# Patient Record
Sex: FEMALE | Race: ASIAN | NOT HISPANIC OR LATINO | Employment: OTHER | URBAN - METROPOLITAN AREA
[De-identification: names, ages, dates, MRNs, and addresses within clinical notes are randomized per-mention and may not be internally consistent; named-entity substitution may affect disease eponyms.]

---

## 2018-03-19 ENCOUNTER — RECORDS - HEALTHEAST (OUTPATIENT)
Dept: LAB | Facility: CLINIC | Age: 53
End: 2018-03-19

## 2018-03-19 LAB
CHOLEST SERPL-MCNC: 166 MG/DL
FASTING STATUS PATIENT QL REPORTED: ABNORMAL
FERRITIN SERPL-MCNC: 91 NG/ML (ref 10–130)
HDLC SERPL-MCNC: 56 MG/DL
LDLC SERPL CALC-MCNC: 78 MG/DL
TRIGL SERPL-MCNC: 158 MG/DL
TSH SERPL DL<=0.005 MIU/L-ACNC: 1.9 UIU/ML (ref 0.3–5)

## 2018-03-20 LAB
HPV SOURCE: NORMAL
HUMAN PAPILLOMA VIRUS 16 DNA: NEGATIVE
HUMAN PAPILLOMA VIRUS 18 DNA: NEGATIVE
HUMAN PAPILLOMA VIRUS FINAL DIAGNOSIS: NORMAL
HUMAN PAPILLOMA VIRUS OTHER HR: NEGATIVE
SPECIMEN DESCRIPTION: NORMAL

## 2018-03-23 LAB
BKR LAB AP ABNORMAL BLEEDING: NO
BKR LAB AP BIRTH CONTROL/HORMONES: NORMAL
BKR LAB AP CERVICAL APPEARANCE: NORMAL
BKR LAB AP GYN ADEQUACY: NORMAL
BKR LAB AP GYN INTERPRETATION: NORMAL
BKR LAB AP HPV REFLEX: NORMAL
BKR LAB AP LMP: NORMAL
BKR LAB AP PATIENT STATUS: NORMAL
BKR LAB AP PREVIOUS ABNORMAL: NORMAL
BKR LAB AP PREVIOUS NORMAL: 2016
HIGH RISK?: NO
PATH REPORT.COMMENTS IMP SPEC: NORMAL
RESULT FLAG (HE HISTORICAL CONVERSION): NORMAL

## 2019-08-23 ENCOUNTER — RECORDS - HEALTHEAST (OUTPATIENT)
Dept: LAB | Facility: CLINIC | Age: 54
End: 2019-08-23

## 2019-08-23 LAB
CHOLEST SERPL-MCNC: 203 MG/DL
FASTING STATUS PATIENT QL REPORTED: ABNORMAL
HDLC SERPL-MCNC: 60 MG/DL
LDLC SERPL CALC-MCNC: 127 MG/DL
TRIGL SERPL-MCNC: 82 MG/DL

## 2022-02-04 ENCOUNTER — LAB REQUISITION (OUTPATIENT)
Dept: LAB | Facility: CLINIC | Age: 57
End: 2022-02-04

## 2022-02-04 DIAGNOSIS — Z13.220 ENCOUNTER FOR SCREENING FOR LIPOID DISORDERS: ICD-10-CM

## 2022-02-04 LAB
CHOLEST SERPL-MCNC: 199 MG/DL
FASTING STATUS PATIENT QL REPORTED: NORMAL
HDLC SERPL-MCNC: 61 MG/DL
LDLC SERPL CALC-MCNC: 122 MG/DL
TRIGL SERPL-MCNC: 78 MG/DL

## 2022-02-04 PROCEDURE — 80061 LIPID PANEL: CPT | Performed by: NURSE PRACTITIONER

## 2022-02-21 ENCOUNTER — TRANSFERRED RECORDS (OUTPATIENT)
Dept: HEALTH INFORMATION MANAGEMENT | Facility: CLINIC | Age: 57
End: 2022-02-21

## 2022-02-23 ENCOUNTER — NURSE TRIAGE (OUTPATIENT)
Dept: NURSING | Facility: CLINIC | Age: 57
End: 2022-02-23

## 2022-02-24 NOTE — TELEPHONE ENCOUNTER
Nava calls and says that she has a rash all over her body. Rash began last night. Rash is pink/red-colored, raised, and itchy. Pt. Says that she had been taking an antibiotic last week Monday-Friday. Denies difficulty breathing or swallowing. Pt. Says that she will call her clinic tomorrow and try to get an appointment. COVID 19 Nurse Triage Plan/Patient Instructions    Please be aware that novel coronavirus (COVID-19) may be circulating in the community. If you develop symptoms such as fever, cough, or SOB or if you have concerns about the presence of another infection including coronavirus (COVID-19), please contact your health care provider or visit [email or web address].     Disposition/Instructions    In-Person Visit with provider recommended. Reference Visit Selection Guide.    Thank you for taking steps to prevent the spread of this virus.  o Limit your contact with others.  o Wear a simple mask to cover your cough.  o Wash your hands well and often.    Resources    [facility]: About COVID-19: [email or web address]    CDC: What to Do If You're Sick: www.cdc.gov/coronavirus/2019-ncov/about/steps-when-sick.html    CDC: Ending Home Isolation: www.cdc.gov/coronavirus/2019-ncov/hcp/disposition-in-home-patients.html     CDC: Caring for Someone: www.cdc.gov/coronavirus/2019-ncov/if-you-are-sick/care-for-someone.html     [facility]: Interim Guidance for Hospital Discharge to Home: www.health.[facility].mn.us/diseases/coronavirus/hcp/hospdischarge.pdf    [facility] clinical trials (COVID-19 research studies): [email or web address]     Below are the COVID-19 hotlines at the [place] Department of Health ([facility]). Interpreters are available.   o For health questions: Call 329-486-8307 or 1-487.425.7788 (7 a.m. to 7 p.m.)  o For questions about schools and childcare: Call 607-874-1222 or 1-929.863.9558 (7 a.m. to 7 p.m.)                   Reason for Disposition    Rash  started more than 3 days after stopping new prescription medicine    Mild widespread rash    Additional Information    Negative: [1] Life-threatening reaction (anaphylaxis) in the past to similar substance (e.g., food, insect bite/sting, chemical, etc.) AND [2] < 2 hours since exposure    Negative: [1] Sudden onset of rash (within last 2 hours) AND [2] difficulty with breathing or swallowing    Negative: Shock suspected (e.g., cold/pale/clammy skin, too weak to stand, low BP, rapid pulse)    Negative: Difficult to awaken or acting confused (e.g., disoriented, slurred speech)    Negative: [1] Purple or blood-colored spots or dots AND [2] fever    Negative: Sounds like a life-threatening emergency to the triager    Negative: Insect bites suspected    Negative: Swimmer's Itch suspected    Negative: Sunburn suspected    Negative: Hives suspected    Negative: Measles suspected AND [2] known exposure to measles in past 3 weeks    Negative: [1] Chickenpox suspected AND [2] known exposure to chickenpox in past 3 weeks    Negative: [1] Life-threatening reaction (anaphylaxis) in the past to the same drug AND [2] < 2 hours since exposure    Negative: Difficulty breathing or wheezing    Negative: [1] Hoarseness or cough AND [2] started soon after 1st dose of drug    Negative: [1] Swollen tongue AND [2] started soon after 1st dose of drug    Negative: [1] Purple or blood-colored rash (spots or dots) AND [2] fever    Negative: Sounds like a life-threatening emergency to the triager    Negative: Rash is only on 1 part of the body (localized)    Negative: Taking new non-prescription (OTC) antihistamine, decongestant, ear drops, eye drops, or other OTC cough/cold medicine    Negative: Taking new prescription antihistamine, allergy medicine, asthma medicine, eye drops, ear drops or nose drops    Negative: [1] Drug rash suspected AND [2] started taking new medicine within last 2 weeks(Exception: antihistamine, eye drops, ear drops,  "decongestant or other OTC cough/cold medicines)    Negative: [1] Widespread rash AND [2] bright red, sunburn-like AND [3] current tampon use or nasal packing    Negative: [1] Widespread rash AND [2] bright red, sunburn-like AND [3] wound infection or recent surgery    Negative: [1] Bright red skin AND [2] peels off in sheets    Negative: Stiff neck (unable to touch chin to chest)    Negative: Fever    Negative: Joint pain or swelling    Negative: Rash looks like large or small blisters (i.e., fluid filled bubbles or sacs on the skin)    Negative: Patient sounds very sick or weak to the triager    Negative: [1] Purple or blood-colored rash (spots or dots) AND [2] no fever AND [3] sounds well to triager    Negative: Sores in mouth    Negative: Face becomes swollen    Negative: [1] Headache AND [2] no fever    Negative: SEVERE itching (i.e., interferes with sleep, normal activities or school)    Negative: Sore throat    Negative: Ring-like appearance of rash (or ask: does it look like a  \"target\" or \"bulls- eye\")    Negative: Pregnant    Protocols used: RASH - WIDESPREAD ON DRUGS-A-AH, RASH OR REDNESS - WIDESPREAD-A-AH    "

## 2023-04-05 ENCOUNTER — TRANSFERRED RECORDS (OUTPATIENT)
Dept: HEALTH INFORMATION MANAGEMENT | Facility: CLINIC | Age: 58
End: 2023-04-05
Payer: COMMERCIAL

## 2023-04-13 ENCOUNTER — TRANSFERRED RECORDS (OUTPATIENT)
Dept: HEALTH INFORMATION MANAGEMENT | Facility: CLINIC | Age: 58
End: 2023-04-13
Payer: COMMERCIAL

## 2023-04-13 ENCOUNTER — LAB REQUISITION (OUTPATIENT)
Dept: LAB | Facility: CLINIC | Age: 58
End: 2023-04-13

## 2023-04-13 DIAGNOSIS — R00.2 PALPITATIONS: ICD-10-CM

## 2023-04-13 DIAGNOSIS — R42 DIZZINESS AND GIDDINESS: ICD-10-CM

## 2023-04-13 DIAGNOSIS — Z13.220 ENCOUNTER FOR SCREENING FOR LIPOID DISORDERS: ICD-10-CM

## 2023-04-13 DIAGNOSIS — Z12.4 ENCOUNTER FOR SCREENING FOR MALIGNANT NEOPLASM OF CERVIX: ICD-10-CM

## 2023-04-13 PROCEDURE — G0145 SCR C/V CYTO,THINLAYER,RESCR: HCPCS | Performed by: NURSE PRACTITIONER

## 2023-04-13 PROCEDURE — 84443 ASSAY THYROID STIM HORMONE: CPT | Performed by: NURSE PRACTITIONER

## 2023-04-13 PROCEDURE — 80048 BASIC METABOLIC PNL TOTAL CA: CPT | Performed by: NURSE PRACTITIONER

## 2023-04-13 PROCEDURE — 87624 HPV HI-RISK TYP POOLED RSLT: CPT | Performed by: NURSE PRACTITIONER

## 2023-04-13 PROCEDURE — 80061 LIPID PANEL: CPT | Performed by: NURSE PRACTITIONER

## 2023-04-14 LAB
ANION GAP SERPL CALCULATED.3IONS-SCNC: 11 MMOL/L (ref 7–15)
BUN SERPL-MCNC: 17.1 MG/DL (ref 8–23)
CALCIUM SERPL-MCNC: 9.4 MG/DL (ref 8.6–10)
CHLORIDE SERPL-SCNC: 106 MMOL/L (ref 98–107)
CHOLEST SERPL-MCNC: 187 MG/DL
CREAT SERPL-MCNC: 0.77 MG/DL (ref 0.51–0.95)
DEPRECATED HCO3 PLAS-SCNC: 25 MMOL/L (ref 22–29)
GFR SERPL CREATININE-BSD FRML MDRD: 88 ML/MIN/1.73M2
GLUCOSE SERPL-MCNC: 88 MG/DL (ref 70–99)
HDLC SERPL-MCNC: 62 MG/DL
LDLC SERPL CALC-MCNC: 98 MG/DL
NONHDLC SERPL-MCNC: 125 MG/DL
POTASSIUM SERPL-SCNC: 3.7 MMOL/L (ref 3.4–5.3)
SODIUM SERPL-SCNC: 142 MMOL/L (ref 136–145)
TRIGL SERPL-MCNC: 135 MG/DL
TSH SERPL DL<=0.005 MIU/L-ACNC: 2.02 UIU/ML (ref 0.3–4.2)

## 2023-04-18 LAB
BKR LAB AP GYN ADEQUACY: NORMAL
BKR LAB AP GYN INTERPRETATION: NORMAL
BKR LAB AP HPV REFLEX: NORMAL
BKR LAB AP PREVIOUS ABNORMAL: NORMAL
PATH REPORT.COMMENTS IMP SPEC: NORMAL
PATH REPORT.COMMENTS IMP SPEC: NORMAL
PATH REPORT.RELEVANT HX SPEC: NORMAL

## 2023-04-19 ENCOUNTER — TRANSFERRED RECORDS (OUTPATIENT)
Dept: HEALTH INFORMATION MANAGEMENT | Facility: CLINIC | Age: 58
End: 2023-04-19
Payer: COMMERCIAL

## 2023-04-20 LAB
HUMAN PAPILLOMA VIRUS 16 DNA: NEGATIVE
HUMAN PAPILLOMA VIRUS 18 DNA: NEGATIVE
HUMAN PAPILLOMA VIRUS FINAL DIAGNOSIS: NORMAL
HUMAN PAPILLOMA VIRUS OTHER HR: NEGATIVE

## 2023-05-02 ENCOUNTER — MEDICAL CORRESPONDENCE (OUTPATIENT)
Dept: HEALTH INFORMATION MANAGEMENT | Facility: CLINIC | Age: 58
End: 2023-05-02
Payer: COMMERCIAL

## 2023-05-03 ENCOUNTER — PATIENT OUTREACH (OUTPATIENT)
Dept: ONCOLOGY | Facility: CLINIC | Age: 58
End: 2023-05-03
Payer: COMMERCIAL

## 2023-05-03 ENCOUNTER — TRANSCRIBE ORDERS (OUTPATIENT)
Dept: OTHER | Age: 58
End: 2023-05-03

## 2023-05-03 ENCOUNTER — TRANSCRIBE ORDERS (OUTPATIENT)
Dept: ONCOLOGY | Facility: CLINIC | Age: 58
End: 2023-05-03
Payer: COMMERCIAL

## 2023-05-03 DIAGNOSIS — C50.912 INFILTRATING DUCTAL CARCINOMA OF LEFT BREAST (H): Primary | ICD-10-CM

## 2023-05-03 NOTE — PROGRESS NOTES
New Patient Oncology Nurse Navigator Note     Referring provider: Cadence Quintero     Referring Clinic/Organization: [facility]     Referred to (specialty:) Medical Oncology and Cancer Surgery      Date Referral Received: May 3, 2023     Evaluation for:  C50.912 (ICD-10-CM) - Infiltrating ductal carcinoma of left breast (H)    Clinical History (per Nurse review of records provided):      Of note, patient has bilateral retropectoral silicone gel implants with a normal contour.     Nava had bilateral screening mammograms on 4/5/23 ([facility] and a focal asymmetry was identified in the lower out left breast, 5 cm from the nipple.     4/19/23 - [facility]  A spiculated mass was confirmed in the lateral left breast 4 to 5 cm from the nipple. Left breast ultrasound showed a 10 x 7 x 10 mm hypoechoic lobulated mass with vascular flow demonstrated, concordant with mammographic findings. Attention to left axilla indicated no pathological adenopathy.    4/24/23 - Case: [id number]                                  A) LEFT BREAST, 3:00, 4 CM FROM NIPPLE, ULTRASOUND-GUIDED CORE BIOPSY:   1. Invasive ductal carcinoma      a. [place] grade: II of III; Cecelia score: 7 of 9      b. Angio-lymphatic invasion: Suspicious      c. Associated DCIS: Present      d. Subtype: Cribriform and Solid      e. Grade of DCIS: 2 and 3 of 3   2. Breast Ancillary Testing:         a. Hormone Receptors:             Estrogen receptor: Positive (98%, strong staining)             Progesterone receptor: Positive (76%, moderate staining)       b. HER2 by IHC: Negative (1+ by manual morphometry)           c. Ki-67: 22% by image analysis    Patient resides in [place], [place].    Records Location: Care Everywhere, Media and See Bookmarked material     Records Needed:   Breast imaging for past 5 years - Nothing in PACS as of     5/4 9:54 - With assistance of  [place] [id number] telephoned and  left voice message for patient requesting call back to assist in scheduling surgery consult.     5/5 8:58 - Nava left voice message indicating her mobile number should be primary. Change made by writer in demographics. She also does NOT speak [place]. Telephoned and spoke with Nava and call transferred to New Patient Scheduling to finalize appointment with Dr. Brewster for surgery consult.  Medical oncology consult will be deferred to surgeon's order.

## 2023-05-05 NOTE — PROGRESS NOTES
History:  Carol is a 58 year old female who I'm asked to see by Cadence Quintero CNP for evaluation of a left breast cancer.  She presents with her , Juice.  This was picked up by screening mammogram.  This was a new asymmetry compared to previous imaging from 2022.  She then underwent diagnostic work-up.  A needle biopsy was done which shows a grade II invasive ductal carcinoma.  It is estrogen receptor positive, progesterone receptor negative, and HER-2 negative.  A post clip placement mammogram was not obtained.  She denies having any breast symptoms prior to this.  Denies palpable masses, skin changes, or nipple discharge.  She has had abnormal mammograms in the past requiring biopsy.  They came back benign.    Past medical history:  Denies    Past surgical history:  Augmentation mammoplasty.  Subpectoral at least 15 years ago.    Medications:     calcium carbonate 500 mg, elemental, (OSCAL 500) 1250 (500 Ca) MG TABS tablet, , Disp: , Rfl:      fish oil-omega-3 fatty acids (OMEGA-3 FISH OIL) 1000 MG capsule, Take 1 capsule by mouth, Disp: , Rfl:      vitamin C (ASCORBIC ACID) 500 MG tablet, Take by mouth daily, Disp: , Rfl:      VITAMIN E PO, Take by mouth daily, Disp: , Rfl:      zinc 50 MG TABS, Take by mouth daily, Disp: , Rfl:     Allergies:  No Known Allergies    Social History:  Denies alcohol, tobacco, illicit drug use.  .  Has 2 siblings and 2 sons.  Both are teenagers.  She is planning on visiting her parents in [place] for 4 weeks and was planning on leaving the middle of June.    Family History:  She has a sister who had breast cancer in her 40s.  She is unsure if genetic testing was performed.    Review of systems:  General ROS: No complaints or constitutional symptoms  Skin: No complaints or symptoms   Hematologic/Lymphatic: No symptoms or complaints  Psychiatric: No symptoms or complaints  Endocrine: No excessive fatigue, no hypermetabolic symptoms reported  Respiratory ROS: No cough,  shortness of breath, or wheezing  Cardiovascular ROS: No chest pain or dyspnea on exertion  Breast ROS: Significant bruising since biopsy  Gastrointestinal ROS: No abdominal pain, nausea, diarrhea, or constipation  Musculoskeletal ROS: No recent injuries reported  Neurological ROS: No focal neurologic defects reported.      Physical exam:  General: Alert, cooperative, appears stated age   Skin: Skin color, texture, turgor normal, no rashes or lesions   Lymphatic: No obvious adenopathy, no swelling   Eyes: No scleral icterus, pupils equal  HENT: No traumatic injury to the head or face, no gross abnormalities  Lungs: Normal respiratory effort, breath sounds equal bilaterally  Heart: Regular rate and rhythm  Breasts: Good shape and symmetry visually.  Scars from breast augmentation are in the bilateral axilla.  Significant ecchymosis to the left breast around the areola from 3:00 around to 9:00.  Left breast 3:00 zone 2 has a palpable 1 cm fullness consistent with her known location of her tumor.  No palpable lymphadenopathy.  The bilateral implants are riding up high closer to the upper outer quadrant  Abdomen: Soft, non-distended and non-tender to palpation  Neurologic: Grossly intact    Imaging:  Pertinent images personally reviewed by myself and discussed with the patient.    Radiology reports:  Exam Date: 04/19/2023  EXAM: DIAGNOSTIC LEFT DIGITAL TOMOSYNTHESIS MAMMOGRAPHY WITH CAD AND FOCUSED LEFT BREAST ULTRASOUND EXAM.  CLINICAL INFORMATION: Follow-up study to an abnormal mammogram dated 4/5/2023  COMPARISON: 4/5/2023    TECHNIQUE:  - Diagnostic Mammography: Left spot CC and left full-field ML tomographic images. CAD was applied.  - Breast Ultrasound was performed using high-resolution ultrasound transducer. Study was focused towards the area of clinical/ mammographic abnormality only.  Total Lifetime Breast Cancer Risk assessment (TLR): 7.38%, Low Risk Category (<10%) based on information provided by the  patient and mammographic breast density utilizing recognized breast cancer risk assessment model. National average TLR =12.5%.    Mammogram: There is a spiculated mass confirmed in the lateral left breast 4 to 5 cm from the nipple. Further evaluation by ultrasound is recommended and will be performed.    Ultrasound: Attention was directed to the region of clinical concern at 3 o'clock, 4 cm from the nipple. There is a 10 x 7 x 10 mm hypoechoic lobulated mass with vascular flow demonstrated, concordant with mammographic findings. The mass is considered suspicious.  Attention was also directed to the left axilla. There is no pathologic adenopathy.    CONCLUSION:  1.10 mm suspicious mass in the left breast without axillary adenopathy.  ACR BI-RADS Category 5-highly suspicious    My interpretation:  All of the images were not pushed over.  I was able to log into Personal Style Finder to review them.  With the implant pushed out of the way, and upper outer quadrant mass can be seen within the left breast.    Pathology:  A) LEFT BREAST, 3:00, 4 CM FROM NIPPLE, ULTRASOUND-GUIDED CORE BIOPSY:   1. Invasive ductal carcinoma      a. Cecelia grade: II of III; Cecelia score: 7 of 9      b. Angio-lymphatic invasion: Suspicious      c. Associated DCIS: Present      d. Subtype: Cribriform and Solid      e. Grade of DCIS: 2 and 3 of 3   2. Breast Ancillary Testing:         a. Hormone Receptors:             Estrogen receptor: Positive (98%, strong staining)             Progesterone receptor: Positive (76%, moderate staining)       b. HER2 by IHC: Negative (1+ by manual morphometry)           c. Ki-67: 22% by image analysis    IMPRESSION:  Left breast invasive ductal carcinoma    - Grade II, T1, N0, ER/OK+, HER2-    PLAN:   Discussed the surgical options for treatment of breast cancer which generally are a lumpectomy (partial mastectomy) combined with radiation versus a mastectomy.  Explained that the survival benefit is the same for both.   The difference is in local recurrence risk.  The patient is a good candidate for a lumpectomy.  With the palpable fullness and how small her breasts are, I would plan on using ultrasound to visualize and plan the lumpectomy.  For mastectomy, she would be a candidate for skin sparing or nipple sparing mastectomies.  With the location of her tumor, I would prefer a periareolar approach.  Discussed SLN biopsy.  The procedure and rationale were explained.  Discussed that at this point we do not know yet whether or not she will need chemotherapy and we may not know until we get all of the results of surgery back.  Sometimes the need for chemotherapy is dependent upon an Oncotype score.  Since the tumor is estrogen receptor positive, she will be a candidate for endocrine therapy.    After our discussion, all questions were answered to satisfaction.  With her family history of cancers, a referral has been placed to genetic counseling.  She is interested in pursuing testing so the breast actionable panel has been ordered and consent obtained.  I will give her a call once I have the results in about 3 weeks.  While she is waiting for the genetic testing results, she will continue to think about the surgery options.  If she is at all contemplating mastectomy with reconstruction, then I encouraged her to meet with plastic surgery.  If she went this route, her surgery would likely not be until after her trip to [place].  I would then place her on neoadjuvant endocrine therapy while she waits for surgery.  She understands that our breast procedures are outpatient with same-day discharge.  Lumpectomy is performed under MAC anesthesia.  Mastectomy based procedures are performed under general anesthesia.  With mastectomies drains are left in place.  The risks and benefits of the respective surgeries were explained.  Also talked about expected recovery time.  She would then follow-up with myself about 1-2 weeks after surgery to review  final pathology and next steps.    Arina Brewster DO  General Surgeon  [facility]  Breast [address] [address]  Office: [phone]  Employed by - [facility]

## 2023-05-08 ENCOUNTER — LAB (OUTPATIENT)
Dept: LAB | Facility: CLINIC | Age: 58
End: 2023-05-08
Payer: COMMERCIAL

## 2023-05-08 ENCOUNTER — PATIENT OUTREACH (OUTPATIENT)
Dept: ONCOLOGY | Facility: CLINIC | Age: 58
End: 2023-05-08

## 2023-05-08 ENCOUNTER — OFFICE VISIT (OUTPATIENT)
Dept: SURGERY | Facility: CLINIC | Age: 58
End: 2023-05-08
Attending: SURGERY
Payer: COMMERCIAL

## 2023-05-08 DIAGNOSIS — C50.912 INVASIVE DUCTAL CARCINOMA OF BREAST, LEFT (H): ICD-10-CM

## 2023-05-08 DIAGNOSIS — C50.912 INVASIVE DUCTAL CARCINOMA OF BREAST, LEFT (H): Primary | ICD-10-CM

## 2023-05-08 LAB
INTERPRETATION: NORMAL
LAB PDF RESULT: NORMAL
SIGNIFICANT RESULTS: NORMAL
SPECIMEN DESCRIPTION: NORMAL
TEST DETAILS, MDL: NORMAL

## 2023-05-08 PROCEDURE — 36415 COLL VENOUS BLD VENIPUNCTURE: CPT

## 2023-05-08 PROCEDURE — 99244 OFF/OP CNSLTJ NEW/EST MOD 40: CPT | Performed by: SURGERY

## 2023-05-08 PROCEDURE — G0463 HOSPITAL OUTPT CLINIC VISIT: HCPCS | Performed by: SURGERY

## 2023-05-08 RX ORDER — CHLORAL HYDRATE 500 MG
1 CAPSULE ORAL
COMMUNITY

## 2023-05-08 RX ORDER — ASCORBIC ACID 500 MG
TABLET ORAL DAILY
COMMUNITY

## 2023-05-08 RX ORDER — IBUPROFEN 200 MG
CAPSULE ORAL
COMMUNITY

## 2023-05-08 RX ORDER — GINSENG 100 MG
CAPSULE ORAL DAILY
COMMUNITY

## 2023-05-08 NOTE — NURSING NOTE
Cherelle presents to [facility] today for a surgical consult with Dr. Brewster  regarding her newly diagnosed  breast cancer.  She is accompanied by her , danish,  for consult.  RN assessment and EMR update.   Patient given a Breast Cancer Packet, contents reviewed.  She met with Dr. Brewster  see dictation for details of visit. She will plan genetic testing, will have blood draw on her way out today.  She will think over her options and call to schedule when she has a decision made.  Support provided, invited calls.  RN time 15 mins.

## 2023-05-08 NOTE — LETTER
5/8/2023         RE: Nava Goldman  [address] [facility] [address]        Dear Colleague,    Thank you for referring your patient, Nava Goldman, to the [facility] [place]. Please see a copy of my visit note below.    History:  Carol is a 58 year old female who I'm asked to see by Cadence Quintero CNP for evaluation of a left breast cancer.  She presents with her , Juice.  This was picked up by screening mammogram.  This was a new asymmetry compared to previous imaging from 2022.  She then underwent diagnostic work-up.  A needle biopsy was done which shows a grade II invasive ductal carcinoma.  It is estrogen receptor positive, progesterone receptor negative, and HER-2 negative.  A post clip placement mammogram was not obtained.  She denies having any breast symptoms prior to this.  Denies palpable masses, skin changes, or nipple discharge.  She has had abnormal mammograms in the past requiring biopsy.  They came back benign.    Past medical history:  Denies    Past surgical history:  Augmentation mammoplasty.  Subpectoral at least 15 years ago.    Medications:     calcium carbonate 500 mg, elemental, (OSCAL 500) 1250 (500 Ca) MG TABS tablet, , Disp: , Rfl:      fish oil-omega-3 fatty acids (OMEGA-3 FISH OIL) 1000 MG capsule, Take 1 capsule by mouth, Disp: , Rfl:      vitamin C (ASCORBIC ACID) 500 MG tablet, Take by mouth daily, Disp: , Rfl:      VITAMIN E PO, Take by mouth daily, Disp: , Rfl:      zinc 50 MG TABS, Take by mouth daily, Disp: , Rfl:     Allergies:  No Known Allergies    Social History:  Denies alcohol, tobacco, illicit drug use.  .  Has 2 siblings and 2 sons.  Both are teenagers.  She is planning on visiting her parents in [place] for 4 weeks and was planning on leaving the middle of June.    Family History:  She has a sister who had breast cancer in her 40s.  She is unsure if genetic testing was performed.    Review of systems:  General ROS:  No complaints or constitutional symptoms  Skin: No complaints or symptoms   Hematologic/Lymphatic: No symptoms or complaints  Psychiatric: No symptoms or complaints  Endocrine: No excessive fatigue, no hypermetabolic symptoms reported  Respiratory ROS: No cough, shortness of breath, or wheezing  Cardiovascular ROS: No chest pain or dyspnea on exertion  Breast ROS: Significant bruising since biopsy  Gastrointestinal ROS: No abdominal pain, nausea, diarrhea, or constipation  Musculoskeletal ROS: No recent injuries reported  Neurological ROS: No focal neurologic defects reported.      Physical exam:  General: Alert, cooperative, appears stated age   Skin: Skin color, texture, turgor normal, no rashes or lesions   Lymphatic: No obvious adenopathy, no swelling   Eyes: No scleral icterus, pupils equal  HENT: No traumatic injury to the head or face, no gross abnormalities  Lungs: Normal respiratory effort, breath sounds equal bilaterally  Heart: Regular rate and rhythm  Breasts: Good shape and symmetry visually.  Scars from breast augmentation are in the bilateral axilla.  Significant ecchymosis to the left breast around the areola from 3:00 around to 9:00.  Left breast 3:00 zone 2 has a palpable 1 cm fullness consistent with her known location of her tumor.  No palpable lymphadenopathy.  The bilateral implants are riding up high closer to the upper outer quadrant  Abdomen: Soft, non-distended and non-tender to palpation  Neurologic: Grossly intact    Imaging:  Pertinent images personally reviewed by myself and discussed with the patient.    Radiology reports:  Exam Date: 04/19/2023  EXAM: DIAGNOSTIC LEFT DIGITAL TOMOSYNTHESIS MAMMOGRAPHY WITH CAD AND FOCUSED LEFT BREAST ULTRASOUND EXAM.  CLINICAL INFORMATION: Follow-up study to an abnormal mammogram dated 4/5/2023  COMPARISON: 4/5/2023    TECHNIQUE:  - Diagnostic Mammography: Left spot CC and left full-field ML tomographic images. CAD was applied.  - Breast Ultrasound was  performed using high-resolution ultrasound transducer. Study was focused towards the area of clinical/ mammographic abnormality only.  Total Lifetime Breast Cancer Risk assessment (TLR): 7.38%, Low Risk Category (<10%) based on information provided by the patient and mammographic breast density utilizing recognized breast cancer risk assessment model. National average TLR =12.5%.    Mammogram: There is a spiculated mass confirmed in the lateral left breast 4 to 5 cm from the nipple. Further evaluation by ultrasound is recommended and will be performed.    Ultrasound: Attention was directed to the region of clinical concern at 3 o'clock, 4 cm from the nipple. There is a 10 x 7 x 10 mm hypoechoic lobulated mass with vascular flow demonstrated, concordant with mammographic findings. The mass is considered suspicious.  Attention was also directed to the left axilla. There is no pathologic adenopathy.    CONCLUSION:  1.10 mm suspicious mass in the left breast without axillary adenopathy.  ACR BI-RADS Category 5-highly suspicious    My interpretation:  All of the images were not pushed over.  I was able to log into [employer] to review them.  With the implant pushed out of the way, and upper outer quadrant mass can be seen within the left breast.    Pathology:  A) LEFT BREAST, 3:00, 4 CM FROM NIPPLE, ULTRASOUND-GUIDED CORE BIOPSY:   1. Invasive ductal carcinoma      a. [place] grade: II of III; Cecelia score: 7 of 9      b. Angio-lymphatic invasion: Suspicious      c. Associated DCIS: Present      d. Subtype: Cribriform and Solid      e. Grade of DCIS: 2 and 3 of 3   2. Breast Ancillary Testing:         a. Hormone Receptors:             Estrogen receptor: Positive (98%, strong staining)             Progesterone receptor: Positive (76%, moderate staining)       b. HER2 by IHC: Negative (1+ by manual morphometry)           c. Ki-67: 22% by image analysis    IMPRESSION:  Left breast invasive ductal carcinoma    - Grade II,  T1, N0, ER/AZ+, HER2-    PLAN:   Discussed the surgical options for treatment of breast cancer which generally are a lumpectomy (partial mastectomy) combined with radiation versus a mastectomy.  Explained that the survival benefit is the same for both.  The difference is in local recurrence risk.  The patient is a good candidate for a lumpectomy.  With the palpable fullness and how small her breasts are, I would plan on using ultrasound to visualize and plan the lumpectomy.  For mastectomy, she would be a candidate for skin sparing or nipple sparing mastectomies.  With the location of her tumor, I would prefer a periareolar approach.  Discussed SLN biopsy.  The procedure and rationale were explained.  Discussed that at this point we do not know yet whether or not she will need chemotherapy and we may not know until we get all of the results of surgery back.  Sometimes the need for chemotherapy is dependent upon an Oncotype score.  Since the tumor is estrogen receptor positive, she will be a candidate for endocrine therapy.    After our discussion, all questions were answered to satisfaction.  With her family history of cancers, a referral has been placed to genetic counseling.  She is interested in pursuing testing so the breast actionable panel has been ordered and consent obtained.  I will give her a call once I have the results in about 3 weeks.  While she is waiting for the genetic testing results, she will continue to think about the surgery options.  If she is at all contemplating mastectomy with reconstruction, then I encouraged her to meet with plastic surgery.  If she went this route, her surgery would likely not be until after her trip to [place].  I would then place her on neoadjuvant endocrine therapy while she waits for surgery.  She understands that our breast procedures are outpatient with same-day discharge.  Lumpectomy is performed under MAC anesthesia.  Mastectomy based procedures are performed under  general anesthesia.  With mastectomies drains are left in place.  The risks and benefits of the respective surgeries were explained.  Also talked about expected recovery time.  She would then follow-up with myself about 1-2 weeks after surgery to review final pathology and next steps.    Arina Brewster DO  General Surgeon  [facility]  Breast [address] [address]  Office: [phone]  Employed by - [facility]            Again, thank you for allowing me to participate in the care of your patient.        Sincerely,        Arina Brewster DO

## 2023-05-08 NOTE — PROGRESS NOTES
Writer received referral to Cancer Risk Management/Genetic Counseling.    Referred for: breast cancer     Referral reviewed for appropriate plan, and sent to New Patient Scheduling for completion.    Bridgette García, RN, BSN  Oncology New Patient Nurse Navigator   [facility]  [phone]

## 2023-05-15 LAB — INTERPRETATION: NORMAL

## 2023-05-24 ENCOUNTER — LAB (OUTPATIENT)
Dept: LAB | Facility: CLINIC | Age: 58
End: 2023-05-24
Payer: COMMERCIAL

## 2023-05-24 ENCOUNTER — TELEPHONE (OUTPATIENT)
Dept: SURGERY | Facility: CLINIC | Age: 58
End: 2023-05-24

## 2023-05-24 DIAGNOSIS — C50.912 INFILTRATING DUCTAL CARCINOMA OF LEFT BREAST (H): Primary | ICD-10-CM

## 2023-05-24 PROCEDURE — G0452 MOLECULAR PATHOLOGY INTERPR: HCPCS | Mod: 26 | Performed by: PATHOLOGY

## 2023-05-24 PROCEDURE — 81162 BRCA1&2 GEN FULL SEQ DUP/DEL: CPT | Performed by: SURGERY

## 2023-05-24 RX ORDER — CEFAZOLIN SODIUM/WATER 2 G/20 ML
2 SYRINGE (ML) INTRAVENOUS
Status: CANCELLED | OUTPATIENT
Start: 2023-06-22

## 2023-05-24 NOTE — TELEPHONE ENCOUNTER
Cherelle called today. Has decided she will have bilateral mastectomies with immediate reconstruction.  She has checked with her insurance and she is unable to use the [facility] and will see Dr. Medina.  Message sent to his nurse, Hilary, to help patient find an appointment to see him.  Message sent to Dr. Brewster and surgery schedulers as well.    Patient had planned a trip to [place] this summer but said she has put that on hold until she figures out her surgery plans.    Support provided, invited calls.

## 2023-06-02 ENCOUNTER — OFFICE VISIT (OUTPATIENT)
Dept: PLASTIC SURGERY | Facility: AMBULATORY SURGERY CENTER | Age: 58
End: 2023-06-02
Payer: COMMERCIAL

## 2023-06-02 VITALS
SYSTOLIC BLOOD PRESSURE: 110 MMHG | WEIGHT: 116 LBS | DIASTOLIC BLOOD PRESSURE: 70 MMHG | BODY MASS INDEX: 21.9 KG/M2 | HEIGHT: 61 IN | HEART RATE: 65 BPM

## 2023-06-02 DIAGNOSIS — C50.112 MALIGNANT NEOPLASM OF CENTRAL PORTION OF LEFT BREAST IN FEMALE, ESTROGEN RECEPTOR POSITIVE (H): Primary | ICD-10-CM

## 2023-06-02 DIAGNOSIS — Z17.0 MALIGNANT NEOPLASM OF CENTRAL PORTION OF LEFT BREAST IN FEMALE, ESTROGEN RECEPTOR POSITIVE (H): Primary | ICD-10-CM

## 2023-06-02 PROCEDURE — 99205 OFFICE O/P NEW HI 60 MIN: CPT | Performed by: PLASTIC SURGERY

## 2023-06-02 NOTE — LETTER
6/2/2023         RE: Nava Goldman  [address] [facility] [address]        Dear Colleague,    Thank you for referring your patient, Nava Goldman, to the [facility] PLASTIC SURGERY CLINIC [place]. Please see a copy of my visit note below.    Chief complaint:  Left breast cancer     History of present illness:  This is a 58 year old female who I'm asked to see by Dr. Brewster for evaluation of a left breast cancer. This was picked up by screening mammogram.  A needle biopsy was done which showed  left breast invasive ductal carcinoma.  It is estrogen receptor positive, progesterone receptor  negative, and HER-2  negative.  Patient is referred to me to discuss breast reconstruction options.    Interestingly, she underwent bilateral augmentation mammoplasty in the subpectoral plane, 20 years ago.     Past medical history:  No past medical history on file.     Past surgical history:  Bilateral augmentation mammoplasty, subpectoral plane, via transaxillary approach.  Patient believe these are silicone breast implants.  Surgery was performed in [place].     Allergies:  No known drug allergies     Medications:    Current Outpatient Medications:      calcium carbonate 500 mg, elemental, (OSCAL 500) 1250 (500 Ca) MG TABS tablet, , Disp: , Rfl:      fish oil-omega-3 fatty acids (OMEGA-3 FISH OIL) 1000 MG capsule, Take 1 capsule by mouth, Disp: , Rfl:      vitamin C (ASCORBIC ACID) 500 MG tablet, Take by mouth daily, Disp: , Rfl:      VITAMIN E PO, Take by mouth daily, Disp: , Rfl:      zinc 50 MG TABS, Take by mouth daily, Disp: , Rfl:      Family history:  Sister with breast cancer     GYN history:  G2, P2     Social History:  Denies tobacco, drinks alcohol socially     Review of systems:  General ROS: No complaints or constitutional symptoms  Skin: No complaints or symptoms   Hematologic/Lymphatic: No symptoms or complaints  Psychiatric: No symptoms or complaints  Endocrine: No excessive fatigue,  "no hypermetabolic symptoms reported  Respiratory ROS: No cough, shortness of breath, or wheezing  Cardiovascular ROS: No chest pain or dyspnea on exertion  Breast ROS: Denies nipple discharge, denies nipple inversion, denies palpable breast masses, denies peau d'orange, denies capsular contracture.  Gastrointestinal ROS: No abdominal pain, nausea, diarrhea, or constipation  Musculoskeletal ROS: No recent injuries reported  Neurological ROS: No symptoms or complaints     Physical exam:  /70 (BP Location: Right arm, Patient Position: Sitting)   Pulse 65   Ht 1.549 m (5' 1\")   Wt 52.6 kg (116 lb)   BMI 21.92 kg/m    General: Alert, cooperative, appears stated age   Skin: Skin color, texture, turgor normal, no rashes or lesions   Lymphatic: No obvious adenopathy, no swelling   Eyes: No scleral icterus, pupils equal  HENT: No traumatic injury to the head or face, no gross abnormalities  Lungs: Normal respiratory effort, breath sounds equal bilaterally  Heart: Regular rate and rhythm  Breasts: Bilateral grade 1 ptosis, left breast is slightly larger than the right breast.  No nipple inversion, no peau d'orange.    There is ecchymosis on the left breast, at the level of the 3 o'clock position from the nipple areolar complex.  No palpable breast masses or nipple discharge.    Patient present with Baker 1 capsular contracture.  These are subpectoral breast implants.  On the right breast sternal notch to nipple distance 23 cm, nipple to inframammary fold distance is 5 cm, breast diameter 15 cm.  On the left breast sternal notch to nipple distance 22 cm, nipple to inframammary fold is 6 cm and breast diameter is 17 cm. There were no palpable axillary lymphadenopathies bilaterally.  Abdomen: Soft, non-distended and non-tender to palpation  Neurologic: Grossly intact                                         Assessment:  This is a 58 year old patient with newly diagnosed left breast invasive ductal carcinoma.    Patient " presents with bilateral subpectoral breast implants that are 20 years old.  Her previous augmentation mammoplasty was performed via transaxillary approach in [place].     She is scheduled with Dr. Brewster for bilateral skin sparing mastectomies, possible nipple sparing mastectomies with left sentinel lymph node biopsy.     PLAN:   This patient is an excellent candidate for immediate bilateral breast reconstruction.    Ideally, the approach for the possible nipple sparing mastectomies, would be through the inframammary fold, with removal of the subpectoral breast implants and exchange for new silicone, full height breast implants.    I will bring full height, cohesive, Allergan implants of 365 cc, 345 cc, 335 cc, and 325 cc with corresponding sizers.    I will have on standby, a full height, extra projection 450 cc tissue expander.    I will cover the implants with AlloDerm.    I will utilize the spy machine to interrogate blood supply to the mastectomy flap and nipple areolar complex bilaterally.     I also discussed with her options for autologous breast reconstruction.       I also have a lengthy discussion about the risks of the surgery and they include but are not limited to scarring, infection, potential loss of the implant requiring explantation, hematoma, seroma, breast asymmetry, nipple asymmetry, inframammary fold asymmetry, permanent loss of the sensation of the nipple areolar complex and breast skin, need for potential future revisional surgeries, possible fat grafting, relationship and intimacy problems leading to breakup and or separation/divorce. No guarantees that her reconstructive breasts will be of the exact size or shape that she has right now were given to the patient. I have stressed to the patient that this is reconstructive breast surgery after breast cancer or to prevent breast cancer not plain cosmetic breast surgery.     Patient has acknowledged all these risks and she will think about it and  will let me know what she decides.    She is also considering lumpectomy.  However, I told her that this option would require radiation to her underlying left breast implant.  Radiation will increase risk of capsular contracture, implant extrusion, and deformity on her left breast.     I will wait for her decision.     Time spent with the patient 80 minutes.    Charles Medina MD , FACS   Diplomate American Board of Plastic Surgery  Diplomate American Board of Surgery  Adj. Assistant Professor of Surgery  Division of Plastic & Reconstructive Surgery   [facility]  Office: [phone]   6/4/2023 at 9:37 PM         Again, thank you for allowing me to participate in the care of your patient.        Sincerely,        Charles Medina MD

## 2023-06-02 NOTE — PROGRESS NOTES
Chief complaint:  Left breast cancer     History of present illness:  This is a 58 year old female who I'm asked to see by Dr. Brewster for evaluation of a left breast cancer. This was picked up by screening mammogram.  A needle biopsy was done which showed  left breast invasive ductal carcinoma.  It is estrogen receptor positive, progesterone receptor  negative, and HER-2  negative.  Patient is referred to me to discuss breast reconstruction options.    Interestingly, she underwent bilateral augmentation mammoplasty in the subpectoral plane, 20 years ago.     Past medical history:  No past medical history on file.     Past surgical history:  Bilateral augmentation mammoplasty, subpectoral plane, via transaxillary approach.  Patient believe these are silicone breast implants.  Surgery was performed in [place].     Allergies:  No known drug allergies     Medications:    Current Outpatient Medications:      calcium carbonate 500 mg, elemental, (OSCAL 500) 1250 (500 Ca) MG TABS tablet, , Disp: , Rfl:      fish oil-omega-3 fatty acids (OMEGA-3 FISH OIL) 1000 MG capsule, Take 1 capsule by mouth, Disp: , Rfl:      vitamin C (ASCORBIC ACID) 500 MG tablet, Take by mouth daily, Disp: , Rfl:      VITAMIN E PO, Take by mouth daily, Disp: , Rfl:      zinc 50 MG TABS, Take by mouth daily, Disp: , Rfl:      Family history:  Sister with breast cancer     GYN history:  G2, P2     Social History:  Denies tobacco, drinks alcohol socially     Review of systems:  General ROS: No complaints or constitutional symptoms  Skin: No complaints or symptoms   Hematologic/Lymphatic: No symptoms or complaints  Psychiatric: No symptoms or complaints  Endocrine: No excessive fatigue, no hypermetabolic symptoms reported  Respiratory ROS: No cough, shortness of breath, or wheezing  Cardiovascular ROS: No chest pain or dyspnea on exertion  Breast ROS: Denies nipple discharge, denies nipple inversion, denies palpable breast masses, denies peau d'orange,  "denies capsular contracture.  Gastrointestinal ROS: No abdominal pain, nausea, diarrhea, or constipation  Musculoskeletal ROS: No recent injuries reported  Neurological ROS: No symptoms or complaints     Physical exam:  /70 (BP Location: Right arm, Patient Position: Sitting)   Pulse 65   Ht 1.549 m (5' 1\")   Wt 52.6 kg (116 lb)   BMI 21.92 kg/m    General: Alert, cooperative, appears stated age   Skin: Skin color, texture, turgor normal, no rashes or lesions   Lymphatic: No obvious adenopathy, no swelling   Eyes: No scleral icterus, pupils equal  HENT: No traumatic injury to the head or face, no gross abnormalities  Lungs: Normal respiratory effort, breath sounds equal bilaterally  Heart: Regular rate and rhythm  Breasts: Bilateral grade 1 ptosis, left breast is slightly larger than the right breast.  No nipple inversion, no peau d'orange.    There is ecchymosis on the left breast, at the level of the 3 o'clock position from the nipple areolar complex.  No palpable breast masses or nipple discharge.    Patient present with Baker 1 capsular contracture.  These are subpectoral breast implants.  On the right breast sternal notch to nipple distance 23 cm, nipple to inframammary fold distance is 5 cm, breast diameter 15 cm.  On the left breast sternal notch to nipple distance 22 cm, nipple to inframammary fold is 6 cm and breast diameter is 17 cm. There were no palpable axillary lymphadenopathies bilaterally.  Abdomen: Soft, non-distended and non-tender to palpation  Neurologic: Grossly intact                                         Assessment:  This is a 58 year old patient with newly diagnosed left breast invasive ductal carcinoma.    Patient presents with bilateral subpectoral breast implants that are 20 years old.  Her previous augmentation mammoplasty was performed via transaxillary approach in [place].     She is scheduled with Dr. Brewster for bilateral skin sparing mastectomies, possible nipple sparing " mastectomies with left sentinel lymph node biopsy.     PLAN:   This patient is an excellent candidate for immediate bilateral breast reconstruction.    Ideally, the approach for the possible nipple sparing mastectomies, would be through the inframammary fold, with removal of the subpectoral breast implants and exchange for new silicone, full height breast implants.    I will bring full height, cohesive, Allergan implants of 365 cc, 345 cc, 335 cc, and 325 cc with corresponding sizers.    I will have on standby, a full height, extra projection 450 cc tissue expander.    I will cover the implants with AlloDerm.    I will utilize the spy machine to interrogate blood supply to the mastectomy flap and nipple areolar complex bilaterally.     I also discussed with her options for autologous breast reconstruction.       I also have a lengthy discussion about the risks of the surgery and they include but are not limited to scarring, infection, potential loss of the implant requiring explantation, hematoma, seroma, breast asymmetry, nipple asymmetry, inframammary fold asymmetry, permanent loss of the sensation of the nipple areolar complex and breast skin, need for potential future revisional surgeries, possible fat grafting, relationship and intimacy problems leading to breakup and or separation/divorce. No guarantees that her reconstructive breasts will be of the exact size or shape that she has right now were given to the patient. I have stressed to the patient that this is reconstructive breast surgery after breast cancer or to prevent breast cancer not plain cosmetic breast surgery.     Patient has acknowledged all these risks and she will think about it and will let me know what she decides.    She is also considering lumpectomy.  However, I told her that this option would require radiation to her underlying left breast implant.  Radiation will increase risk of capsular contracture, implant extrusion, and deformity on  her left breast.     I will wait for her decision.     Time spent with the patient 80 minutes.    Charles Medina MD , FACS   Diplomate American Board of Plastic Surgery  Diplomate American Board of Surgery  Adj. Assistant Professor of Surgery  Division of Plastic & Reconstructive Surgery   [facility]  Office: [phone]   6/4/2023 at 9:37 PM

## 2023-06-05 ENCOUNTER — TELEPHONE (OUTPATIENT)
Dept: SURGERY | Facility: CLINIC | Age: 58
End: 2023-06-05
Payer: COMMERCIAL

## 2023-06-05 NOTE — TELEPHONE ENCOUNTER
Patient called, saw Dr. Medina last week. Has more questions for Dr. Brewster about her surgery.  She did book a flight to [place] on 7-16-23 to see her parents.  Per Dr. Brewster, best for patient to come in for face to face conversation. Made Cherelle an appointment to see Dr. Brewster on Monday, 6-12-23 at 1020. Support provided, invited calls.

## 2023-06-06 ENCOUNTER — TELEPHONE (OUTPATIENT)
Dept: SURGERY | Facility: CLINIC | Age: 58
End: 2023-06-06
Payer: COMMERCIAL

## 2023-06-06 NOTE — TELEPHONE ENCOUNTER
I talked with Cherelle and finalized details for upcoming surgery with David Brewster and Adam. We went over pre and post op appointments. I also let her know that Dr. Medina's team will reach out to her about his post op appointments. I let her know I will e-mail a confirmation letter to her. She's in agreement with the plan.

## 2023-06-06 NOTE — LETTER
Thank you for choosing [facility] General Surgery for your care. You are scheduled for surgery with David Brewster and Adam. Details are as follows:    Pre-op Physical: Call your primary care clinic, [facility], to schedule a pre-op physical. This needs to be completed within 30 days of the date of surgery. Per anesthesia, failure to complete within the required time frame will result in cancellation of surgery.     Surgery Date: 06/22/2023     Location: [place] [address]. Enter through the main doors of the hospital and stop at the Welcome Desk. Someone will direct/escort you to surgery admissions.     Approximate Arrival Time: 5:20 a.m. (time subject to change)  Surgery Start Time: 7:20 a.m. (time subject to change)     Post op Appointment: 07/05/2023 at 1:20 p.m., with Dr. Brewster at [facility]. [address] [place] [address], [address] [place] [address]. Arrive at 1:05 p.m.      Dr. Medina's care team will reach out to you with the post op appointment information for Dr. Medina.     Prep Tasks and Info:     Review your medications with your primary care or prescribing physician; they will advise you which meds to stop and when, and when you can resume taking.  Certain medications like blood thinners need to be stopped in advance of surgery to proceed safely.    Please shower the evening before and morning of surgery with Hibiclens soap. This can be found at your local pharmacy. It can also be picked up for FREE at the pharmacy at our [place] office on 1st floor. [address] [place] [address]     Fasting instructions will be provided by the pre-op nurse who will call you 1-3 days before surgery.  Typically we advise normal food up to 8 hours before you arrive for surgery. Clear liquids only from then until 2 hours before you arrive surgery then nothing at all by mouth.  The nurse will review your specific instructions with you at the  call.     Smoking impacts your body's ability to heal properly.  If you are a smoker, we strongly urge you to stop smoking. Plastic surgery patients are required to be nicotine free for at least 8 weeks before surgery.     You will need an adult to drive you home and stay with you 24 hours after surgery. Public transportation or Medical Van Services are not permitted.    Visitor restrictions are subject to change, please verify with the pre-op nurse how many people may accompany you when they call.    We always encourage you to notify your insurance any time you have medical tests or procedures scheduled including surgery. The number is usually right on the back of your insurance card. Please call [facility] Cost of Care at [phone]  if you'd like a surgery quote.     Call our office if you have any questions! Thank you!

## 2023-06-08 ENCOUNTER — OFFICE VISIT (OUTPATIENT)
Dept: PLASTIC SURGERY | Facility: AMBULATORY SURGERY CENTER | Age: 58
End: 2023-06-08
Payer: COMMERCIAL

## 2023-06-08 DIAGNOSIS — C50.112 MALIGNANT NEOPLASM OF CENTRAL PORTION OF LEFT BREAST IN FEMALE, ESTROGEN RECEPTOR POSITIVE (H): Primary | ICD-10-CM

## 2023-06-08 DIAGNOSIS — Z17.0 MALIGNANT NEOPLASM OF CENTRAL PORTION OF LEFT BREAST IN FEMALE, ESTROGEN RECEPTOR POSITIVE (H): Primary | ICD-10-CM

## 2023-06-08 PROCEDURE — 99214 OFFICE O/P EST MOD 30 MIN: CPT | Performed by: PLASTIC SURGERY

## 2023-06-08 NOTE — LETTER
6/8/2023         RE: Nava Goldman  [address] [address]        Dear Colleague,    Thank you for referring your patient, Nava Goldman, to the [facility] [place]. Please see a copy of my visit note below.    Mrs. Goldman is the 58 years old lady with history of left breast cancer.  She has bilateral augmentation mammoplasty.  She returns today to discuss reconstruction.    Patient is seriously considering flying to [place] for 4 weeks and then return for her surgery.    At this time, patient seems to be inclined for left breast lumpectomy, possible left breast implant explantation, insertion of a tissue expander and then in the future, once she has completed radiation, undergo second stage reconstruction with a permanent implant on the left breast and exchange of old implant for a new implant on the right breast.    I have stressed to her that I cannot guarantee symmetry with this approach.  I have informed her that the left breast will always be different than the right breast because of lumpectomy, radiation and the protocol of reconstruction that will be different between the left breast with a cancer on the right breast without it.    Patient told me that she will think about it once again.    She has an appointment with Dr. Brewster.  I will wait for the patient's decision.    Time spent with the patient 30 minutes.    Charles Medina MD , FACS   Diplomate American Board of Plastic Surgery  Diplomate American Board of Surgery  Adj. Assistant Professor of Surgery  Division of Plastic & Reconstructive Surgery   [facility]  Office: [phone]   6/8/2023 at 2:26 PM               Again, thank you for allowing me to participate in the care of your patient.        Sincerely,        Charles Medina MD

## 2023-06-08 NOTE — PROGRESS NOTES
Mrs. Goldman is the 58 years old lady with history of left breast cancer.  She has bilateral augmentation mammoplasty.  She returns today to discuss reconstruction.    Patient is seriously considering flying to [place] for 4 weeks and then return for her surgery.    At this time, patient seems to be inclined for left breast lumpectomy, possible left breast implant explantation, insertion of a tissue expander and then in the future, once she has completed radiation, undergo second stage reconstruction with a permanent implant on the left breast and exchange of old implant for a new implant on the right breast.    I have stressed to her that I cannot guarantee symmetry with this approach.  I have informed her that the left breast will always be different than the right breast because of lumpectomy, radiation and the protocol of reconstruction that will be different between the left breast with a cancer on the right breast without it.    Patient told me that she will think about it once again.    She has an appointment with Dr. Brewster.  I will wait for the patient's decision.    Time spent with the patient 30 minutes.    Charles Medina MD , FACS   Diplomate American Board of Plastic Surgery  Diplomate American Board of Surgery  Adj. Assistant Professor of Surgery  Division of Plastic & Reconstructive Surgery   [facility]  Office: [phone]   6/8/2023 at 2:26 PM

## 2023-06-08 NOTE — NURSING NOTE
Patient here today to discuss breast recon. The patient will likely be pushing back surgery until she returns from [place] after visiting her parents.    Patient has appointment with Dr. Brewster to discuss on Monday as well.     Hilary Fuchs RN on 6/8/2023 at 1:22 PM

## 2023-06-09 NOTE — PROGRESS NOTES
History:  Nava Goldman is a 58 year old female who presents for follow up of left breast cancer.  She presents with her , Juice.  I saw her over a month ago and is currently scheduled for a bilateral mastectomy with reconstruction (Dr. Medina) on June 22.      Physical exam:  BREAST: No breast scars or palpable masses bilaterally.  The bilateral breasts are more full superiorly compared to inferiorly.  The majority of the breast tissue is inferior due to the axillary placed implants.    ASSESSMENT:  Nava Goldman is a 58 year old female with left-sided breast cancer    PLAN:  We reviewed logistics of surgery along with healing.  We reviewed different surgery options.  She is most interested in pursuing bilateral mastectomy with immediate implant reconstruction.  If she were to have surgery after her trip to [place], I think that would be too far out from her initial cancer diagnosis and it is putting her at risk.  Therefore, I recommend keeping our original surgery date.  We will see how her recovery goes.  If things need to get pushed back, then it could happen.    Arina Brewster DO  General Surgeon  [facility]  [facility] Center [facility]  [address] [place] [id number]  Office: [phone]  Employed by - [facility]

## 2023-06-12 ENCOUNTER — OFFICE VISIT (OUTPATIENT)
Dept: SURGERY | Facility: CLINIC | Age: 58
End: 2023-06-12
Attending: SURGERY
Payer: COMMERCIAL

## 2023-06-12 DIAGNOSIS — C50.912 INVASIVE DUCTAL CARCINOMA OF BREAST, LEFT (H): Primary | ICD-10-CM

## 2023-06-12 PROCEDURE — 99213 OFFICE O/P EST LOW 20 MIN: CPT | Performed by: SURGERY

## 2023-06-12 NOTE — NURSING NOTE
Cherelle presents to [facility] for a follow up surgical consult with Dr. Brewster  regarding making a surgical decision regarding her breast cancer. She has met with Dr. Medina 2 X. Patient is accompanied by her  for appointment.  RN assessment and EMR update.  Patient met with Dr. Brewster .  See dictation for details of visit. She will stick to her origional  plan of bilateral mastectomies with immediate implant reconstruction.   She is already scheduled for next week.  Support provided, invited calls.    RN time 12 mins.

## 2023-06-12 NOTE — LETTER
6/12/2023         RE: Nava Goldman  [address] [facility] [address]        Dear Colleague,    Thank you for referring your patient, Nava Goldman, to the [facility] [place]. Please see a copy of my visit note below.    History:  Nava Goldman is a 58 year old female who presents for follow up of left breast cancer.  She presents with her , Juice.  I saw her over a month ago and is currently scheduled for a bilateral mastectomy with reconstruction (Dr. Medina) on June 22.      Physical exam:  BREAST: No breast scars or palpable masses bilaterally.  The bilateral breasts are more full superiorly compared to inferiorly.  The majority of the breast tissue is inferior due to the axillary placed implants.    ASSESSMENT:  Nava Goldman is a 58 year old female with left-sided breast cancer    PLAN:  We reviewed logistics of surgery along with healing.  We reviewed different surgery options.  She is most interested in pursuing bilateral mastectomy with immediate implant reconstruction.  If she were to have surgery after her trip to [place], I think that would be too far out from her initial cancer diagnosis and it is putting her at risk.  Therefore, I recommend keeping our original surgery date.  We will see how her recovery goes.  If things need to get pushed back, then it could happen.    Arina Brewster DO  General Surgeon  [facility]  Breast Center [address] [address]  Office: [phone]  Employed by [facility]        Again, thank you for allowing me to participate in the care of your patient.        Sincerely,        Arina Brewster DO

## 2023-06-19 RX ORDER — DOCUSATE SODIUM 100 MG/1
100 CAPSULE, LIQUID FILLED ORAL 2 TIMES DAILY
Qty: 20 CAPSULE | Refills: 0 | Status: SHIPPED | OUTPATIENT
Start: 2023-06-19

## 2023-06-19 RX ORDER — HYDROCODONE BITARTRATE AND ACETAMINOPHEN 5; 325 MG/1; MG/1
1 TABLET ORAL EVERY 4 HOURS PRN
Qty: 25 TABLET | Refills: 0 | Status: SHIPPED | OUTPATIENT
Start: 2023-06-19 | End: 2023-06-23

## 2023-06-19 RX ORDER — CEPHALEXIN 500 MG/1
500 CAPSULE ORAL 3 TIMES DAILY
Qty: 21 CAPSULE | Refills: 0 | Status: SHIPPED | OUTPATIENT
Start: 2023-06-19 | End: 2023-07-05

## 2023-06-19 RX ORDER — MUPIROCIN 20 MG/G
OINTMENT TOPICAL 3 TIMES DAILY
Qty: 30 G | Refills: 0 | Status: ON HOLD | OUTPATIENT
Start: 2023-06-19 | End: 2023-06-22

## 2023-06-19 RX ORDER — ONDANSETRON 4 MG/1
4 TABLET, FILM COATED ORAL EVERY 6 HOURS PRN
Qty: 16 TABLET | Refills: 0 | Status: SHIPPED | OUTPATIENT
Start: 2023-06-19

## 2023-06-19 RX ORDER — CEFAZOLIN SODIUM/WATER 2 G/20 ML
2 SYRINGE (ML) INTRAVENOUS
Status: CANCELLED | OUTPATIENT
Start: 2023-06-22

## 2023-06-20 ENCOUNTER — TELEPHONE (OUTPATIENT)
Dept: PLASTIC SURGERY | Facility: CLINIC | Age: 58
End: 2023-06-20
Payer: COMMERCIAL

## 2023-06-20 NOTE — PROGRESS NOTES
Patient has agreed to proceed with surgery.    Preoperative prescriptions have been sent to her preferred pharmacy.    Charles Medina MD , FACS   Diplomate American Board of Plastic Surgery  Diplomate American Board of Surgery  Adj. Assistant Professor of Surgery  Division of Plastic & Reconstructive Surgery   [facility]  Office: [phone]   6/19/2023 at 11:10 PM

## 2023-06-20 NOTE — TELEPHONE ENCOUNTER
Call placed to patient to discuss pre-op instructions.     This included picking up medications from pharmacy today! Patient was informed that she needed to start using the Bactroban and Hibiclens today through morning of surgery.    A copy of the pre and post-op instructions was emailed to the patient.     Hilary Fuchs RN on 6/20/2023 at 8:09 AM

## 2023-06-21 ENCOUNTER — ANESTHESIA EVENT (OUTPATIENT)
Dept: SURGERY | Facility: HOSPITAL | Age: 58
End: 2023-06-21
Payer: COMMERCIAL

## 2023-06-22 ENCOUNTER — ANESTHESIA (OUTPATIENT)
Dept: SURGERY | Facility: HOSPITAL | Age: 58
End: 2023-06-22
Payer: COMMERCIAL

## 2023-06-22 ENCOUNTER — HOSPITAL ENCOUNTER (OUTPATIENT)
Facility: HOSPITAL | Age: 58
Discharge: HOME | End: 2023-06-22
Attending: SURGERY | Admitting: SURGERY
Payer: COMMERCIAL

## 2023-06-22 ENCOUNTER — HOSPITAL ENCOUNTER (OUTPATIENT)
Dept: NUCLEAR MEDICINE | Facility: HOSPITAL | Age: 58
Discharge: HOME | End: 2023-06-22
Attending: SURGERY | Admitting: SURGERY
Payer: COMMERCIAL

## 2023-06-22 VITALS
RESPIRATION RATE: 16 BRPM | SYSTOLIC BLOOD PRESSURE: 121 MMHG | OXYGEN SATURATION: 98 % | BODY MASS INDEX: 22.16 KG/M2 | DIASTOLIC BLOOD PRESSURE: 62 MMHG | HEIGHT: 61 IN | HEART RATE: 73 BPM | TEMPERATURE: 97.5 F | WEIGHT: 117.4 LBS

## 2023-06-22 DIAGNOSIS — C50.912 INVASIVE DUCTAL CARCINOMA OF BREAST, LEFT (H): ICD-10-CM

## 2023-06-22 LAB — GLUCOSE BLDC GLUCOMTR-MCNC: 89 MG/DL (ref 70–99)

## 2023-06-22 PROCEDURE — 82962 GLUCOSE BLOOD TEST: CPT

## 2023-06-22 PROCEDURE — 88307 TISSUE EXAM BY PATHOLOGIST: CPT | Mod: TC | Performed by: SURGERY

## 2023-06-22 PROCEDURE — 250N000011 HC RX IP 250 OP 636: Mod: JZ

## 2023-06-22 PROCEDURE — 250N000011 HC RX IP 250 OP 636: Mod: JZ | Performed by: ANESTHESIOLOGY

## 2023-06-22 PROCEDURE — 258N000003 HC RX IP 258 OP 636: Performed by: ANESTHESIOLOGY

## 2023-06-22 PROCEDURE — 250N000011 HC RX IP 250 OP 636: Performed by: SURGERY

## 2023-06-22 PROCEDURE — 37799 UNLISTED PX VASCULAR SURGERY: CPT | Performed by: PLASTIC SURGERY

## 2023-06-22 PROCEDURE — 250N000009 HC RX 250: Performed by: PLASTIC SURGERY

## 2023-06-22 PROCEDURE — 343N000001 HC RX 343: Performed by: SURGERY

## 2023-06-22 PROCEDURE — 250N000013 HC RX MED GY IP 250 OP 250 PS 637: Performed by: ANESTHESIOLOGY

## 2023-06-22 PROCEDURE — 250N000025 HC SEVOFLURANE, PER MIN: Performed by: SURGERY

## 2023-06-22 PROCEDURE — 250N000011 HC RX IP 250 OP 636: Performed by: ANESTHESIOLOGY

## 2023-06-22 PROCEDURE — 258N000003 HC RX IP 258 OP 636

## 2023-06-22 PROCEDURE — 250N000013 HC RX MED GY IP 250 OP 250 PS 637: Performed by: PLASTIC SURGERY

## 2023-06-22 PROCEDURE — 88341 IMHCHEM/IMCYTCHM EA ADD ANTB: CPT | Mod: 26 | Performed by: PATHOLOGY

## 2023-06-22 PROCEDURE — 38792 RA TRACER ID OF SENTINL NODE: CPT

## 2023-06-22 PROCEDURE — 370N000017 HC ANESTHESIA TECHNICAL FEE, PER MIN: Performed by: SURGERY

## 2023-06-22 PROCEDURE — 38525 BIOPSY/REMOVAL LYMPH NODES: CPT | Mod: 51 | Performed by: SURGERY

## 2023-06-22 PROCEDURE — 88377 M/PHMTRC ALYS ISHQUANT/SEMIQ: CPT | Mod: 26 | Performed by: MEDICAL GENETICS

## 2023-06-22 PROCEDURE — A9520 TC99 TILMANOCEPT DIAG 0.5MCI: HCPCS | Performed by: SURGERY

## 2023-06-22 PROCEDURE — C1760 CLOSURE DEV, VASC: HCPCS | Performed by: SURGERY

## 2023-06-22 PROCEDURE — 88377 M/PHMTRC ALYS ISHQUANT/SEMIQ: CPT | Performed by: SURGERY

## 2023-06-22 PROCEDURE — P9045 ALBUMIN (HUMAN), 5%, 250 ML: HCPCS | Mod: JZ

## 2023-06-22 PROCEDURE — 250N000009 HC RX 250

## 2023-06-22 PROCEDURE — L8600 IMPLANT BREAST SILICONE/EQ: HCPCS | Performed by: SURGERY

## 2023-06-22 PROCEDURE — 250N000009 HC RX 250: Performed by: SURGERY

## 2023-06-22 PROCEDURE — 250N000011 HC RX IP 250 OP 636: Performed by: PLASTIC SURGERY

## 2023-06-22 PROCEDURE — 272N000001 HC OR GENERAL SUPPLY STERILE: Performed by: SURGERY

## 2023-06-22 PROCEDURE — 88360 TUMOR IMMUNOHISTOCHEM/MANUAL: CPT | Mod: 26 | Performed by: PATHOLOGY

## 2023-06-22 PROCEDURE — 360N000076 HC SURGERY LEVEL 3, PER MIN: Performed by: SURGERY

## 2023-06-22 PROCEDURE — 19340 INSJ BREAST IMPLT SM D MAST: CPT | Mod: 50 | Performed by: PLASTIC SURGERY

## 2023-06-22 PROCEDURE — 710N000012 HC RECOVERY PHASE 2, PER MINUTE: Performed by: SURGERY

## 2023-06-22 PROCEDURE — 88307 TISSUE EXAM BY PATHOLOGIST: CPT | Mod: 26 | Performed by: PATHOLOGY

## 2023-06-22 PROCEDURE — 88342 IMHCHEM/IMCYTCHM 1ST ANTB: CPT | Mod: 26 | Performed by: PATHOLOGY

## 2023-06-22 PROCEDURE — 999N000141 HC STATISTIC PRE-PROCEDURE NURSING ASSESSMENT: Performed by: SURGERY

## 2023-06-22 PROCEDURE — 19303 MAST SIMPLE COMPLETE: CPT | Mod: 50 | Performed by: SURGERY

## 2023-06-22 PROCEDURE — 710N000010 HC RECOVERY PHASE 1, LEVEL 2, PER MIN: Performed by: SURGERY

## 2023-06-22 PROCEDURE — 258N000001 HC RX 258: Performed by: PLASTIC SURGERY

## 2023-06-22 DEVICE — IMPLANTABLE DEVICE: Type: IMPLANTABLE DEVICE | Site: BREAST | Status: FUNCTIONAL

## 2023-06-22 RX ORDER — LIDOCAINE 40 MG/G
CREAM TOPICAL
Status: DISCONTINUED | OUTPATIENT
Start: 2023-06-22 | End: 2023-06-22 | Stop reason: HOSPADM

## 2023-06-22 RX ORDER — ONDANSETRON 2 MG/ML
INJECTION INTRAMUSCULAR; INTRAVENOUS PRN
Status: DISCONTINUED | OUTPATIENT
Start: 2023-06-22 | End: 2023-06-22

## 2023-06-22 RX ORDER — FENTANYL CITRATE 50 UG/ML
INJECTION, SOLUTION INTRAMUSCULAR; INTRAVENOUS PRN
Status: DISCONTINUED | OUTPATIENT
Start: 2023-06-22 | End: 2023-06-22

## 2023-06-22 RX ORDER — DEXAMETHASONE SODIUM PHOSPHATE 10 MG/ML
INJECTION, SOLUTION INTRAMUSCULAR; INTRAVENOUS PRN
Status: DISCONTINUED | OUTPATIENT
Start: 2023-06-22 | End: 2023-06-22

## 2023-06-22 RX ORDER — CEFAZOLIN SODIUM/WATER 2 G/20 ML
2 SYRINGE (ML) INTRAVENOUS
Status: COMPLETED | OUTPATIENT
Start: 2023-06-22 | End: 2023-06-22

## 2023-06-22 RX ORDER — PROPOFOL 10 MG/ML
INJECTION, EMULSION INTRAVENOUS PRN
Status: DISCONTINUED | OUTPATIENT
Start: 2023-06-22 | End: 2023-06-22

## 2023-06-22 RX ORDER — ONDANSETRON 2 MG/ML
4 INJECTION INTRAMUSCULAR; INTRAVENOUS EVERY 30 MIN PRN
Status: DISCONTINUED | OUTPATIENT
Start: 2023-06-22 | End: 2023-06-22 | Stop reason: HOSPADM

## 2023-06-22 RX ORDER — HYDROMORPHONE HYDROCHLORIDE 1 MG/ML
0.4 INJECTION, SOLUTION INTRAMUSCULAR; INTRAVENOUS; SUBCUTANEOUS EVERY 5 MIN PRN
Status: DISCONTINUED | OUTPATIENT
Start: 2023-06-22 | End: 2023-06-22 | Stop reason: HOSPADM

## 2023-06-22 RX ORDER — FENTANYL CITRATE 50 UG/ML
25 INJECTION, SOLUTION INTRAMUSCULAR; INTRAVENOUS EVERY 5 MIN PRN
Status: DISCONTINUED | OUTPATIENT
Start: 2023-06-22 | End: 2023-06-22 | Stop reason: HOSPADM

## 2023-06-22 RX ORDER — HYDROMORPHONE HYDROCHLORIDE 1 MG/ML
0.2 INJECTION, SOLUTION INTRAMUSCULAR; INTRAVENOUS; SUBCUTANEOUS EVERY 5 MIN PRN
Status: DISCONTINUED | OUTPATIENT
Start: 2023-06-22 | End: 2023-06-22 | Stop reason: HOSPADM

## 2023-06-22 RX ORDER — GINSENG 100 MG
CAPSULE ORAL PRN
Status: DISCONTINUED | OUTPATIENT
Start: 2023-06-22 | End: 2023-06-22 | Stop reason: HOSPADM

## 2023-06-22 RX ORDER — LIDOCAINE HYDROCHLORIDE 10 MG/ML
INJECTION, SOLUTION INFILTRATION; PERINEURAL PRN
Status: DISCONTINUED | OUTPATIENT
Start: 2023-06-22 | End: 2023-06-22

## 2023-06-22 RX ORDER — KETAMINE HYDROCHLORIDE 10 MG/ML
INJECTION INTRAMUSCULAR; INTRAVENOUS PRN
Status: DISCONTINUED | OUTPATIENT
Start: 2023-06-22 | End: 2023-06-22

## 2023-06-22 RX ORDER — PROPOFOL 10 MG/ML
INJECTION, EMULSION INTRAVENOUS CONTINUOUS PRN
Status: DISCONTINUED | OUTPATIENT
Start: 2023-06-22 | End: 2023-06-22

## 2023-06-22 RX ORDER — MAGNESIUM SULFATE 4 G/50ML
4 INJECTION INTRAVENOUS ONCE
Status: COMPLETED | OUTPATIENT
Start: 2023-06-22 | End: 2023-06-22

## 2023-06-22 RX ORDER — FENTANYL CITRATE 50 UG/ML
50 INJECTION, SOLUTION INTRAMUSCULAR; INTRAVENOUS EVERY 5 MIN PRN
Status: DISCONTINUED | OUTPATIENT
Start: 2023-06-22 | End: 2023-06-22 | Stop reason: HOSPADM

## 2023-06-22 RX ORDER — ONDANSETRON 4 MG/1
4 TABLET, ORALLY DISINTEGRATING ORAL EVERY 30 MIN PRN
Status: DISCONTINUED | OUTPATIENT
Start: 2023-06-22 | End: 2023-06-22 | Stop reason: HOSPADM

## 2023-06-22 RX ORDER — INDOCYANINE GREEN AND WATER 25 MG
KIT INJECTION PRN
Status: DISCONTINUED | OUTPATIENT
Start: 2023-06-22 | End: 2023-06-22

## 2023-06-22 RX ORDER — OXYCODONE HYDROCHLORIDE 5 MG/1
5 TABLET ORAL
Status: DISCONTINUED | OUTPATIENT
Start: 2023-06-22 | End: 2023-06-22 | Stop reason: HOSPADM

## 2023-06-22 RX ORDER — SODIUM CHLORIDE, SODIUM LACTATE, POTASSIUM CHLORIDE, CALCIUM CHLORIDE 600; 310; 30; 20 MG/100ML; MG/100ML; MG/100ML; MG/100ML
INJECTION, SOLUTION INTRAVENOUS CONTINUOUS
Status: DISCONTINUED | OUTPATIENT
Start: 2023-06-22 | End: 2023-06-22 | Stop reason: HOSPADM

## 2023-06-22 RX ORDER — OXYCODONE HYDROCHLORIDE 5 MG/1
10 TABLET ORAL
Status: DISCONTINUED | OUTPATIENT
Start: 2023-06-22 | End: 2023-06-22 | Stop reason: HOSPADM

## 2023-06-22 RX ORDER — ACETAMINOPHEN 325 MG/1
975 TABLET ORAL ONCE
Status: COMPLETED | OUTPATIENT
Start: 2023-06-22 | End: 2023-06-22

## 2023-06-22 RX ORDER — TRANEXAMIC ACID 100 MG/ML
INJECTION, SOLUTION INTRAVENOUS
Status: DISCONTINUED
Start: 2023-06-22 | End: 2023-06-22 | Stop reason: HOSPADM

## 2023-06-22 RX ORDER — OXYCODONE HYDROCHLORIDE 5 MG/1
10 TABLET ORAL
Status: COMPLETED | OUTPATIENT
Start: 2023-06-22 | End: 2023-06-22

## 2023-06-22 RX ORDER — MAGNESIUM HYDROXIDE 1200 MG/15ML
LIQUID ORAL PRN
Status: DISCONTINUED | OUTPATIENT
Start: 2023-06-22 | End: 2023-06-22 | Stop reason: HOSPADM

## 2023-06-22 RX ADMIN — PHENYLEPHRINE HYDROCHLORIDE 100 MCG: 10 INJECTION INTRAVENOUS at 11:27

## 2023-06-22 RX ADMIN — MAGNESIUM SULFATE HEPTAHYDRATE 4 G: 80 INJECTION, SOLUTION INTRAVENOUS at 07:15

## 2023-06-22 RX ADMIN — PHENYLEPHRINE HYDROCHLORIDE 100 MCG: 10 INJECTION INTRAVENOUS at 10:46

## 2023-06-22 RX ADMIN — FENTANYL CITRATE 100 MCG: 50 INJECTION, SOLUTION INTRAMUSCULAR; INTRAVENOUS at 07:37

## 2023-06-22 RX ADMIN — PHENYLEPHRINE HYDROCHLORIDE 100 MCG: 10 INJECTION INTRAVENOUS at 11:49

## 2023-06-22 RX ADMIN — FENTANYL CITRATE 25 MCG: 50 INJECTION, SOLUTION INTRAMUSCULAR; INTRAVENOUS at 13:36

## 2023-06-22 RX ADMIN — SODIUM CHLORIDE, POTASSIUM CHLORIDE, SODIUM LACTATE AND CALCIUM CHLORIDE: 600; 310; 30; 20 INJECTION, SOLUTION INTRAVENOUS at 07:13

## 2023-06-22 RX ADMIN — PROPOFOL 20 MG: 10 INJECTION, EMULSION INTRAVENOUS at 08:45

## 2023-06-22 RX ADMIN — INDOCYANINE GREEN AND WATER 7.5 MG: KIT at 11:45

## 2023-06-22 RX ADMIN — PROPOFOL 140 MG: 10 INJECTION, EMULSION INTRAVENOUS at 07:37

## 2023-06-22 RX ADMIN — KETAMINE HYDROCHLORIDE 20 MG: 10 INJECTION, SOLUTION INTRAMUSCULAR; INTRAVENOUS at 08:19

## 2023-06-22 RX ADMIN — LIDOCAINE HYDROCHLORIDE 3 ML: 10 INJECTION, SOLUTION INFILTRATION; PERINEURAL at 07:37

## 2023-06-22 RX ADMIN — ROCURONIUM BROMIDE 10 MG: 50 INJECTION, SOLUTION INTRAVENOUS at 11:01

## 2023-06-22 RX ADMIN — PHENYLEPHRINE HYDROCHLORIDE 100 MCG: 10 INJECTION INTRAVENOUS at 11:42

## 2023-06-22 RX ADMIN — ALBUMIN HUMAN: 0.05 INJECTION, SOLUTION INTRAVENOUS at 10:58

## 2023-06-22 RX ADMIN — ACETAMINOPHEN 975 MG: 325 TABLET ORAL at 06:58

## 2023-06-22 RX ADMIN — PHENYLEPHRINE HYDROCHLORIDE 100 MCG: 10 INJECTION INTRAVENOUS at 10:39

## 2023-06-22 RX ADMIN — ROCURONIUM BROMIDE 10 MG: 50 INJECTION, SOLUTION INTRAVENOUS at 08:48

## 2023-06-22 RX ADMIN — TRANEXAMIC ACID 1 G: 1 INJECTION, SOLUTION INTRAVENOUS at 08:05

## 2023-06-22 RX ADMIN — ROCURONIUM BROMIDE 20 MG: 50 INJECTION, SOLUTION INTRAVENOUS at 09:00

## 2023-06-22 RX ADMIN — TILMANOCEPT 0.52 MILLICURIE: KIT at 06:30

## 2023-06-22 RX ADMIN — ROCURONIUM BROMIDE 10 MG: 50 INJECTION, SOLUTION INTRAVENOUS at 12:15

## 2023-06-22 RX ADMIN — KETAMINE HYDROCHLORIDE 30 MG: 10 INJECTION, SOLUTION INTRAMUSCULAR; INTRAVENOUS at 07:37

## 2023-06-22 RX ADMIN — SUGAMMADEX 200 MG: 100 INJECTION, SOLUTION INTRAVENOUS at 13:01

## 2023-06-22 RX ADMIN — DEXAMETHASONE SODIUM PHOSPHATE 10 MG: 10 INJECTION, SOLUTION INTRAMUSCULAR; INTRAVENOUS at 07:37

## 2023-06-22 RX ADMIN — ONDANSETRON 4 MG: 2 INJECTION INTRAMUSCULAR; INTRAVENOUS at 12:42

## 2023-06-22 RX ADMIN — MIDAZOLAM 2 MG: 1 INJECTION INTRAMUSCULAR; INTRAVENOUS at 07:25

## 2023-06-22 RX ADMIN — ROCURONIUM BROMIDE 20 MG: 50 INJECTION, SOLUTION INTRAVENOUS at 09:30

## 2023-06-22 RX ADMIN — SODIUM CHLORIDE, POTASSIUM CHLORIDE, SODIUM LACTATE AND CALCIUM CHLORIDE: 600; 310; 30; 20 INJECTION, SOLUTION INTRAVENOUS at 11:13

## 2023-06-22 RX ADMIN — TRANEXAMIC ACID 1 G: 1 INJECTION, SOLUTION INTRAVENOUS at 12:30

## 2023-06-22 RX ADMIN — PHENYLEPHRINE HYDROCHLORIDE 100 MCG: 10 INJECTION INTRAVENOUS at 10:31

## 2023-06-22 RX ADMIN — HYDROMORPHONE HYDROCHLORIDE 0.5 MG: 1 INJECTION, SOLUTION INTRAMUSCULAR; INTRAVENOUS; SUBCUTANEOUS at 08:57

## 2023-06-22 RX ADMIN — Medication 2 G: at 11:20

## 2023-06-22 RX ADMIN — PROPOFOL 30 MCG/KG/MIN: 10 INJECTION, EMULSION INTRAVENOUS at 07:37

## 2023-06-22 RX ADMIN — ROCURONIUM BROMIDE 50 MG: 50 INJECTION, SOLUTION INTRAVENOUS at 07:37

## 2023-06-22 RX ADMIN — PHENYLEPHRINE HYDROCHLORIDE 100 MCG: 10 INJECTION INTRAVENOUS at 10:12

## 2023-06-22 RX ADMIN — PHENYLEPHRINE HYDROCHLORIDE 100 MCG: 10 INJECTION INTRAVENOUS at 10:55

## 2023-06-22 RX ADMIN — Medication 2 G: at 07:30

## 2023-06-22 RX ADMIN — OXYCODONE HYDROCHLORIDE 10 MG: 5 TABLET ORAL at 14:50

## 2023-06-22 RX ADMIN — FENTANYL CITRATE 25 MCG: 50 INJECTION, SOLUTION INTRAMUSCULAR; INTRAVENOUS at 13:31

## 2023-06-22 RX ADMIN — PHENYLEPHRINE HYDROCHLORIDE 100 MCG: 10 INJECTION INTRAVENOUS at 09:16

## 2023-06-22 RX ADMIN — ONDANSETRON 4 MG: 2 INJECTION INTRAMUSCULAR; INTRAVENOUS at 15:00

## 2023-06-22 ASSESSMENT — ACTIVITIES OF DAILY LIVING (ADL)
ADLS_ACUITY_SCORE: 18

## 2023-06-22 NOTE — OP NOTE
Name:  Nava Jones  PCP:  Cadence Quintero  Procedure Date:  6/22/2023      BILATERAL NIPPLE-SPARING MASTECTOMY WITH LEFT SENTINEL LYMPH NODE BIOPSY AND BILATERAL IMPLANT REMOVAL      Pre-Procedure Diagnosis:  Invasive ductal carcinoma of breast, left    Post-Procedure Diagnosis:    Invasive ductal carcinoma of breast, left     Surgeon:  Arina Brewster DO    Assist:  Brittany Toney PA-C    Anesthesia Type:    GET    Estimated Blood Loss:   20 mL    Specimens:    Bilateral breasts       Drains:   Left by Dr. Medina    Complications:    None apparent    [place] Node Biopsy for Breast Cancer - Left  Operation performed with curative intent Yes   Tracer(s) used to identify sentinel nodes in the upfront surgery (non-neoadjuvant) setting Radioactive tracer   Tracer(s) used to identify sentinel nodes in the neoadjuvant setting N/A   All nodes (colored or non-colored) present at the end of a dye-filled lymphatic channel were removed N/A   All significantly radioactive nodes were removed Yes   All palpably suspicious nodes were removed N/A   Biopsy-proven positive nodes marked with clips prior to chemotherapy were identified and removed N/A       Indication for procedure:  This is a 58-year-old female who recently had a screening mammogram.  An asymmetry was identified within the left breast.  Diagnostic work-up revealed an invasive ductal carcinoma.  She does have underlying breast implants.  She has elected for bilateral mastectomy with reconstruction for surgical treatment.    Operative Report:    The patient was properly identified and brought to the operating suite where she was placed in the supine position.  General anesthesia and perioperative antibiotics were administered.  Preoperatively the patient was injected with Lymphoseek by nuclear medicine for her sentinel lymph node identification.  The patient was prepped and draped in a sterile fashion.  A right-sided curvilinear inframammary incision was made  and skin flaps raised superiorly past the nipple to the clavicle, posteriorly to the pectoralis major, medial to the sternum, inferior to the rectus sheath and laterally to the lateral chest wall.  The entire skin envelope was spared along with the nipple areolar complex.  Extra care was taken deep to the nipple.  The underlying muscle was very thinned out due to the underlying breast implant.  The capsule was carefully incised and a 180 ml textured implant was removed.  The breast tissue was swept from the chest wall using electrocautery.  It was marked for orientation.  Hemostasis was assured within the wound.  Attention was then turned the left where a mirror incision was made.  Skin flaps raised superiorly to the clavicle, posteriorly to the pectoralis muscle, medially to the sternum, inferior to the rectus sheath, and lateral to lateral chest wall.  Extra care was taken deep to the nipple and at the 3 o'clock position zone 2.  The tumor was palpable at 3:00.  It was clearly very close to both the anterior and posterior margins, but all available tissue was removed.  Posteriorly the capsule of the implant was incised and a 180 mL textured silicone implant was removed.  The breast tissue was swept from the chest wall using electrocautery.  The tissue was marked for orientation with double stitch anterior behind the nipple, long lateral, and short superior.  The wound was inspected for hemostasis.  The clavipectoral fascia was incised and the axilla was palpated for abnormalities.  No abnormal lymph nodes were palpated.  Using the gamma probe I identified one sentinel lymph node grouping, with a count of 580.  These were removed with electrocautery and sent for permanent sectioning.  There was no significant remaining gamma activity.  The wound was packed open with a saline soaked gauze.  The procedure was turned over to Dr. Medina for immediate reconstruction.    Brittany Toney PA-C was present during the case  to assist with retraction and exposure.    Disposition:  The patient remains in the operating room for immediate reconstruction.  Once she has received drain teaching, it is anticipated that she will be discharged home.    Arina Brewster DO  General Surgeon  [facility]  Breast [address] [address]  Office: [phone]  Employed by - [facility] [employer] [facility] [facility]

## 2023-06-22 NOTE — INTERVAL H&P NOTE
I have reviewed the surgical (or preoperative) H&P that is linked to this encounter, and examined the patient. There are no significant changes    Clinical Conditions Present on Arrival:  Clinically Significant Risk Factors Present on Admission                     Charles Medina MD , FACS   Diplomate American Board of Plastic Surgery  Diplomate American Board of Surgery  Adj. Assistant Professor of Surgery  Division of Plastic & Reconstructive Surgery   [facility]  Office: [phone]   6/22/2023 at 6:59 AM

## 2023-06-22 NOTE — OR NURSING
Patient's breast implants from prior augmentation surgery were explanted and disposed as trash today 06/22/2023 at 0903, per surgeon Dr. Medina.     Lucie Treadwell RN

## 2023-06-22 NOTE — ANESTHESIA POSTPROCEDURE EVALUATION
Patient: Nava Goldman    Procedure: Procedure(s):  BILATERAL NIPPLE SPARING MASTECTOMY WITH  LEFT SENTINEL LYMPH NODE BIOPSY  RECONSTRUCTION, BREAST, IMMEDIATE PERMANENT BREAST IMPLANT, BILATERAL CAPSULE ORIFICE       Anesthesia Type:  General    Note:  Disposition: Inpatient   Postop Pain Control: Uneventful            Sign Out: Well controlled pain   PONV: No   Neuro/Psych: Uneventful            Sign Out: Acceptable/Baseline neuro status   Airway/Respiratory: Uneventful            Sign Out: Acceptable/Baseline resp. status   CV/Hemodynamics: Uneventful            Sign Out: Acceptable CV status; No obvious hypovolemia; No obvious fluid overload   Other NRE:    DID A NON-ROUTINE EVENT OCCUR?            Last vitals:  Vitals Value Taken Time   /68 06/22/23 1345   Temp 36.4  C (97.5  F) 06/22/23 1317   Pulse 75 06/22/23 1359   Resp 12 06/22/23 1359   SpO2 98 % 06/22/23 1359   Vitals shown include unvalidated device data.    Electronically Signed By: Carlos Lugo MD  June 22, 2023  2:00 PM

## 2023-06-22 NOTE — OP NOTE
Nava Goldman    June 22, 2023      Preoperative diagnosis:   Left breast  invasive ductal carcinoma     Postoperative diagnosis: same     Procedure:      1) Bilateral nipple sparing breast reconstruction with immediate direct to implant subpectoral reconstruction utilizing inframammary fold approach.     For the right breast a  325 cc Allergan  full profile implant was utilized.  Reference SCF-325 and SN  [id number].     For the left breast a  325 cc Allergan full profile implant was also utilized. Reference SCF- 325 and SN [id number].    2) Bilateral lateral capsulorrhaphies in order to close axillary communication in the subpectoral space.     3) Bilateral breasts intraoperative perfusion assessment of mastectomy skin flaps using the SPY fluorescence imaging.     Surgeon: Charles Medina MD     Assistant: Kylah Alston CSA (there was no resident available)     Anesthesia: General     IV fluids:   1700 cc crystalloids and 250 cc of albumin     Urine output:   550 cc     EBL:     5 cc     Findings:  Excellent tissue perfusion, more than 25%, detected by the SPY machine on bilateral nipple areolar complexes (NAC) as well as on the bilateral mastectomy flaps.     Patient presented with bilateral subpectoral textured implants.  She had previous bilateral cosmetic breast augmentation via axillary approach in [place].  The volume of each implant was 180 cc.  They presented with Baker 1 capsule.     Specimens:   Right and left subpectoral breast implant.     Drains:   two #15 Occitan Taz drains: 1 on the right breast and 1 on the left breast, for a total of  2 drains.  Each drain was placed in the prepectoral space.     Disposition: patient tolerated procedure well, she was extubated and transferred to recovery room awake and in stable condition.         Indications:     Mrs. Goldman is a 58 lady with diagnosis of left breast invasive ductal carcinoma. She also presents with prior history of cosmetic bilateral  augmentation mammoplasty in the subpectoral space via transaxillary approach.  This operation was performed in her native [place] more than 20 years ago. She has Baker 1 capsular contracture bilaterally.     She has been offered by Dr. Brewster bilateral nipple skin sparing mastectomies.     Due to the fact that she already presents with bilateral subpectoral implants, I have offered her immediate subpectoral direct to implant breast reconstruction after removal of her old subpectoral implants.     The approach, for both nipple sparing mastectomies and breast reconstruction, will be via inframammary fold.     Since this is nipple sparing mastectomy, I will bring the SPY machine in order to interrogate bilateral nipple areolar complex (NAC) and mastectomy flaps prior to committing to direct to implant breast reconstruction.     Risks were explained to the patient and they include but are not limited to scarring, infection, asymmetry, loss of implant, hematoma, seroma, capsular contracture, ischemia and potential necrosis of the NAC's, possible tattooing of the nipple areolar complex in the future, complete and permanent loss of sensation of the skin of the reconstructed breasts as well as the NAC's, potential need for further surgeries in the future, marital and/or relationship problems.      Patient has acknowledged all these risks and signed informed consent agreeing to proceed.     Procedure:     Patient was identified in the preoperative holding area and preoperative IV antibiotics were given to her.     I then proceeded to perform preoperative markings on the patient's chest.  First I draw the midline, I also marked 2 cm bilaterally and lateral to the midline on each medial aspect of patient's breast. Also, I marked the breast footprint on the anterior axillary line bilaterally. Furthermore, I marked the breast meridian on each breast as well as the native inframammary fold (IMF).  I also measured the distance from  the NAC to the IMF on each breast.  Moreover, I marked the proposed inframammary fold incision for the mastectomy and breast reconstruction.  This new incision was located 0.5 cm caudal to her native IMF.  Finally, the superior aspect of bilateral breast footprint were marked as well.    This is how the markings look like:                    Patient was then brought to the operating room and was placed supine in the operating room table. After general anesthesia was obtained the chest and both breasts were prepped and draped in the sterile surgical fashion.     Dr. Brewster began the nipple sparing mastectomy on the  right breast following the previously designed inframammary fold incision. She also proceeded to explant the breast implant from the subpectoral space. Please see her operative report for further details.  Once this first nipple sparing mastectomy was completed, I was called to begin the reconstruction.  While I was working on the right breast, Dr. Brewster performed the same steps on the contralateral breast.  Once again, please see her operative report for further details.    I proceeded to inspect the prepectoral space where the mastectomy was performed as well as the subpectoral space where the implants used to be located.    Both prepectoral and subpectoral space were copiously irrigated with antibiotic solution as well as chlorhexidine.  I found that hemostasis was excellent.     The removed breast implants were inspected.    Both implants had a volume of 180 cc each and were round, textured, filled with silicone.  There was no evidence of tissue abnormality in the subpectoral plane. The capsule that was present on each subpectoral space was Baker 1.  Both implants were passed off from the field as specimens and then, they were sent to pathology.      After considering her preoperative breast measurements, the size of her breasts, the volume of her breast implants plus her chest frame, I decided to  utilize a   325 cc  full profile sizer in order to determine how she would look like with a permanent implant of this size.      A  325 cc sizers were applied on both breasts in the subpectoral plane and the inframammary fold incisions were closed with temporary staples. Patient was then sat up and I was pleased with this shape and size and the relationship of the implants to her chest frame. Therefore, I made the decision that this volume of implant would be appropriate for her.    Each sizer was 90% covered by the overlying pectoralis major muscle.  10% was not covered, and this was the area where the myotomy was performed in order to remove her old breast implants.     The patient was then placed back in the horizontal position, and I proceeded to interrogate the blood supply of the NAC with the SPY machine in order to make sure that I could continue with the nipple sparing reconstruction and with the placement of bilateral permanent breast implants.    The ICG dye for the SPY machine was given intravenously by the CRNA and with a hand-held probe I proceeded to interrogate both breast's NAC as well as the mastectomy skin flaps.  The percentage of perfusion was greater than 25% on the NAC and throughout both mastectomy flaps.  This was excellent because it confirmed appropriate blood supply to bilateral NAC and bilateral mastectomy flaps.  Therefore, I proceeded with the placement of permanent breast implants.      Both 325 cc sizers were removed and I proceeded to perform bilateral subpectoral lateral capsulorrhaphies in the upper and outer quadrant of each subpectoral space in order to obliterate the communication from the axillary region to the subpectoral plane.  I decided to do this because this patient had previous breast augmentation via transaxillary approach and I want to avoid and prevent cranial displacement of the implant towards the axilla.  The capsulorrhaphy was performed with 0 Nurolon interrupted  stitches.     At this time I irrigated both cavities, on both breasts: the prepectoral space where the mastectomy took place and the subpectoral space where the implants were removed.       I utilized double antibiotic solution followed by chlorhexidine and proceeded to paint again with chlorhexidine, the skin of both mastectomy cavities. The scrub tech, my assistant and I changed to new gloves.  Utilizing a Lu funnel, a  325 full profile, cohesive permanent implant was then placed on both mastectomies cavities in the previously developed subpectoral space.     In the prepectoral space, one # 15 Thai Taz drain was then applied on each breast.  Both drains were secured to the skin with 2-0 silk. This was done on both mastectomy cavities.     Each IMF was then closed in layers, first with 2-0 PDS interrupted stitches from the fascia of the underlying pectoralis major muscle to the remaining breast's capsule, followed by 3-0 Monocryl buried interrupted stitches in the subdermal plane, followed by 4-0 STRATAFIX running subcuticular stitches at the level of the skin.    Bacitracin ointment followed by Xeroform strip gauze, followed by folded sterile 4 x 4 gauze was applied along the IMF incisions.  A bio disc was applied at the insertion site of each Taz drain.  Large Tegaderm was then utilized on each breast to cover all these dressings.    A sports surgical bra was then applied.    At the end of the case capillary refill was less than 2 seconds on each NAC.    Instrument count was reported by nursing personnel as correct.  Patient tolerated procedure well and she was extubated and transferred to recovery room awake and in stable condition.    Charles Medina MD , FACS   Diplomate American Board of Plastic Surgery  Diplomate American Board of Surgery  Adj. Assistant Professor of Surgery  Division of Plastic & Reconstructive Surgery   [facility]  Office: [phone]   6/22/2023 at  6:36 PM

## 2023-06-22 NOTE — ANESTHESIA PROCEDURE NOTES
Airway       Patient location during procedure: OR       Procedure Start/Stop Times: 6/22/2023 7:41 AM  Staff -        Anesthesiologist:  Carlos Lugo MD       CRNA: Kameron Dominguez APRN CRNA       Performed By: CRNA  Consent for Airway        Urgency: elective  Indications and Patient Condition       Indications for airway management: rakan-procedural       Induction type:intravenous       Mask difficulty assessment: 1 - vent by mask    Final Airway Details       Final airway type: endotracheal airway       Successful airway: ETT - single  Endotracheal Airway Details        ETT size (mm): 7.0       Cuffed: yes       Cuff volume (mL): 8       Successful intubation technique: direct laryngoscopy       DL Blade Type: Acevedo 2       Grade View of Cords: 1       Adjucts: stylet       Position: Left       Measured from: lips       Secured at (cm): 22       Bite block used: None    Post intubation assessment        Placement verified by: capnometry, equal breath sounds and chest rise        Number of attempts at approach: 1       Number of other approaches attempted: 0       Secured with: silk tape       Ease of procedure: easy       Dentition: Intact and Unchanged       Dental guard used and removed. Dental Guard Type: Proguard Red.    Medication(s) Administered   Medication Administration Time: 6/22/2023 7:41 AM

## 2023-06-23 ENCOUNTER — OFFICE VISIT (OUTPATIENT)
Dept: PLASTIC SURGERY | Facility: AMBULATORY SURGERY CENTER | Age: 58
End: 2023-06-23
Payer: COMMERCIAL

## 2023-06-23 DIAGNOSIS — Z98.890 STATUS POST BILATERAL BREAST RECONSTRUCTION: Primary | ICD-10-CM

## 2023-06-23 PROCEDURE — 99024 POSTOP FOLLOW-UP VISIT: CPT | Performed by: PLASTIC SURGERY

## 2023-06-23 NOTE — PROGRESS NOTES
Cherelle is a 58 years old lady status post direct to implant breast reconstruction with permanent breast implant in the subpectoral space.  She is 24 hours out from her surgery.  No events overnight.    At the physical exam, no evidence of infection or seroma or hematoma.  Good shape and symmetry bilaterally.  Bilateral Taz drains with serosanguineous fluid.    Plan: Patient may begin taking regular showers, continue drain care and follow-up with me in 1 week for reevaluation.    Patient is doing well from plastic surgery standpoint.    Charles Medina MD , FACS   Diplomate American Board of Plastic Surgery  Diplomate American Board of Surgery  Adj. Assistant Professor of Surgery  Division of Plastic & Reconstructive Surgery   [facility]  Office: [phone]   6/23/2023 at 12:29 PM

## 2023-06-23 NOTE — LETTER
6/23/2023         RE: Nava Goldman  [address] [address]        Dear Colleague,    Thank you for referring your patient, Nava Goldman, to the [facility] [place]. Please see a copy of my visit note below.    Cherelle is a 58 years old lady status post direct to implant breast reconstruction with permanent breast implant in the subpectoral space.  She is 24 hours out from her surgery.  No events overnight.    At the physical exam, no evidence of infection or seroma or hematoma.  Good shape and symmetry bilaterally.  Bilateral Taz drains with serosanguineous fluid.    Plan: Patient may begin taking regular showers, continue drain care and follow-up with me in 1 week for reevaluation.    Patient is doing well from plastic surgery standpoint.    Charles Medina MD , FACS   Diplomate American Board of Plastic Surgery  Diplomate American Board of Surgery  Adj. Assistant Professor of Surgery  Division of Plastic & Reconstructive Surgery   [facility]  Office: [phone]   6/23/2023 at 12:29 PM             Again, thank you for allowing me to participate in the care of your patient.        Sincerely,        Charles Medina MD

## 2023-06-30 ENCOUNTER — OFFICE VISIT (OUTPATIENT)
Dept: PLASTIC SURGERY | Facility: AMBULATORY SURGERY CENTER | Age: 58
End: 2023-06-30
Payer: COMMERCIAL

## 2023-06-30 DIAGNOSIS — Z98.890 STATUS POST BILATERAL BREAST RECONSTRUCTION: Primary | ICD-10-CM

## 2023-06-30 PROCEDURE — 99024 POSTOP FOLLOW-UP VISIT: CPT | Performed by: PLASTIC SURGERY

## 2023-06-30 NOTE — LETTER
6/30/2023         RE: Nava Goldman  [address] [address]        Dear Colleague,    Thank you for referring your patient, Nava Goldman, to the [facility]LAST[facility]INIC [place]. Please see a copy of my visit note below.    Mrs. Goldman is a 58 years old lady status post bilateral nipple sparing mastectomies with immediate direct implant prepectoral reconstruction with 325 cc full profile implants.  She is 8 days out from surgery.  She is doing well.    At the physical exam, patient present with excellent shape and symmetry bilaterally.  No evidence of hematoma or seroma.  Tegaderm dressings still intact.  Taz drain with serous fluid bilaterally.  No evidence of erythema or surgical site infection.    Plan: Continue drain care, continue regular showers, avoid heavy lifting and follow-up with me next Friday for possible drain removal.  Patient is doing well from plastic surgery standpoint.    Charles Medina MD , FACS   Diplomate American Board of Plastic Surgery  Diplomate American Board of Surgery  Adj. Assistant Professor of Surgery  Division of Plastic & Reconstructive Surgery   [facility]  Office: [phone]   6/30/2023 at 3:31 PM         Again, thank you for allowing me to participate in the care of your patient.        Sincerely,        Charles Medina MD

## 2023-06-30 NOTE — PROGRESS NOTES
Mrs. Goldman is a 58 years old lady status post bilateral nipple sparing mastectomies with immediate direct implant prepectoral reconstruction with 325 cc full profile implants.  She is 8 days out from surgery.  She is doing well.    At the physical exam, patient present with excellent shape and symmetry bilaterally.  No evidence of hematoma or seroma.  Tegaderm dressings still intact.  Taz drain with serous fluid bilaterally.  No evidence of erythema or surgical site infection.    Plan: Continue drain care, continue regular showers, avoid heavy lifting and follow-up with me next Friday for possible drain removal.  Patient is doing well from plastic surgery standpoint.    Charles Medina MD , FACS   Diplomate American Board of Plastic Surgery  Diplomate American Board of Surgery  Adj. Assistant Professor of Surgery  Division of Plastic & Reconstructive Surgery   [facility]  Office: [phone]   6/30/2023 at 3:31 PM

## 2023-07-05 ENCOUNTER — PATIENT OUTREACH (OUTPATIENT)
Dept: ONCOLOGY | Facility: CLINIC | Age: 58
End: 2023-07-05

## 2023-07-05 ENCOUNTER — DOCUMENTATION ONLY (OUTPATIENT)
Dept: SURGERY | Facility: CLINIC | Age: 58
End: 2023-07-05

## 2023-07-05 ENCOUNTER — OFFICE VISIT (OUTPATIENT)
Dept: SURGERY | Facility: CLINIC | Age: 58
End: 2023-07-05
Attending: SURGERY
Payer: COMMERCIAL

## 2023-07-05 DIAGNOSIS — C50.912 INVASIVE DUCTAL CARCINOMA OF BREAST, FEMALE, LEFT (H): Primary | ICD-10-CM

## 2023-07-05 PROCEDURE — 99024 POSTOP FOLLOW-UP VISIT: CPT | Performed by: SURGERY

## 2023-07-05 PROCEDURE — G0463 HOSPITAL OUTPT CLINIC VISIT: HCPCS | Performed by: SURGERY

## 2023-07-05 NOTE — NURSING NOTE
Cherelle presents to [facility] for a post op appointment with Dr. Brewster .  She is accompanied by herself for appointment.  RN assessment and EMR update. She states she is doing well, minimal pain.  She has good ROM, reviewed ROM exercises with her. Her drains are still in.  She will see Dr. Medina on Friday, 7-7-23.  Told her to check with him about when she can start her ROM exercises.  Patient met with Dr. Brewster .  See dictation for details of visit.  She will plan to be referred onto Medical Oncology and will plan to follow up with Dr. Brewster  in one year .  Invited calls sooner if she has any questions.  RN time 12 mins.

## 2023-07-05 NOTE — PROGRESS NOTES
New Patient Oncology Nurse Navigator Note     Referring provider: Dr. Arina Brewster     Referring Clinic/Organization: [facility]     Referred to (specialty:) Medical Oncology      Date Referral Received: July 5, 2023     Evaluation for:  Breast cancer     Clinical History (per Nurse review of records provided):       On Oncotype has been requested by Dr. Brewster and requisitioned on 7/5.   Anticipated estimated report date 7/24/23.  Oncotype subsequently reported as RS = 10.    6/22/23 - [id number]  A) RIGHT BREAST, SKIN AND NIPPLE-SPARING TOTAL MASTECTOMY:  - PROLIFERATIVE FIBROCYSTIC CHANGES, INCLUDING USUAL DUCT HYPERPLASIA (UDH),  FOCAL ADENOSIS, AND APOCRINE METAPLASIA  - NEGATIVE FOR ATYPIA OR MALIGNANCY  - NO SKIN PRESENT  - NO LYMPH NODE TISSUE IDENTIFIED  - NORMAL ADIPOSE TISSUE  B) LEFT BREAST, SKIN AND NIPPLE-SPARING TOTAL MASTECTOMY, WITH NEOPLASTIC LESION:  - SEE FULL SYNOPTIC REPORT BELOW  - SUMMARY OF SYNOPSIS:  - INVASIVE DUCTAL CARCINOMA  - HISTOLOGIC GRADE (JEAN CLAUDE HISTOLOGIC SCORE):  - TUBULAR DIFFERENTIATION SCORE 3  - NUCLEAR PLEOMORPHISM SCORE 2  - MITOTIC RATE SCORE 1  - OVERALL GRADE 2 (TOTAL SCORE 6/9)  - DUCTAL CARCINOMA IN SITU (DCIS):  - SOLID PATTERN, INTERMEDIATE NUCLEAR GRADE, NO NECROSIS, AND COMPRISES 20% OF FIRST TUMOR NODULE  - SOLID PATTERN, INTERMEDIATE NUCLEAR GRADE, NO NECROSIS, AND COMPRISES 90% OF SECOND TUMOR NODULE  - TUMOR IS PRESENT AT 2 SEPARATE FOCI (SEE ABOVE):  - INVASIVE DUCTAL CARCINOMA IS 1.5 CM IN GREATEST DIAMETER, FIRST TUMOR NODULE, WITH 20% DCIS  - DCIS COMPRISES 90% OF SECOND TUMOR NODULE, WITH FOCUS OF INVASION 0.3 CM IN DIAMETER  - LOBULAR CARCINOMA IN SITU (LCIS): NOT IDENTIFIED  - NEGATIVE FOR LYMPHOVASCULAR INVASION  - MICROCALCIFICATIONS: NOT IDENTIFIED  - ALL SURGICAL MARGINS NEGATIVE FOR INVASIVE CARCINOMA; NEAREST MARGIN IS ANTERIOR MARGIN, 1 MM FROM TUMOR  - ALL SURGICAL MARGINS NEGATIVE FOR DCIS; NEAREST MARGIN IS  ANTERIOR MARGIN, 1 MM FROM TUMOR  - 5 REGIONAL LYMPH NODES NEGATIVE FOR METASTATIC CARCINOMA (4 SENTINEL NODES AND 1 ADDITIONAL NODE)  - PROLIFERATIVE FIBROCYSTIC CHANGES ALSO PRESENT  - PREVIOUS CORE BIOPSY SITE AND MAMMOTOME CLIP IDENTIFIED IN TUMOR #1  - TUMOR IS STAGE pT1c(m) AND pN0  - ADDITIONAL STUDIES:  - ESTROGEN RECEPTORS POSITIVE (> 95% OF TUMOR NUCLEI STAIN STRONGLY)  - PROGESTERONE RECEPTORS POSITIVE (90% OF TUMOR NUCLEI STAIN STRONGLY)  - HER2/TIFFANIE RECEPTORS EQUIVOCAL FOR OVEREXPRESSION (2+ MEMBRANE STAINING)  C) LEFT BREAST SENTINEL LYMPH NODE:  - 4 LYMPH NODES NEGATIVE FOR MALIGNANCY  - NORMAL ADIPOSE TISSUE  Comment  A p63 immunostain confirms the presence of DCIS in both tumor nodules. A strongly positive E-cadherin immunostain confirms ductal origin of the tumor. Tissue will be referred for molecular (FISH) analysis of HER2/tiffanie overexpression, and the result will be reported in a separate addendum.    HER2 by FISH on [id number] returned as negative.    Records Location: See Bookmarked material     Patient resides in [place], [place].     Writer received referral, reviewed for appropriate plan, and referral transferred to New Patient Scheduling for completion.

## 2023-07-05 NOTE — PROGRESS NOTES
History:  Nava Goldman is s/p bilateral nipple sparing mastectomy and implant replacement on June 22.  She is doing well.  Drains are putting out about 20 cc/day.  The fluid has really lightened and thinned out.    Is planning her trip to [place] on the 16th.    Physical exam:  BREAST: Incisions are covered by gauze which is covered by clear tape over the entirety of the chest.  Nipples are viable and there is good symmetry.  No signs of infection.  Drain output is serous.  Slightly sanguinous on the right side.    Pathology:  A) RIGHT BREAST, SKIN AND NIPPLE-SPARING TOTAL MASTECTOMY:        -  PROLIFERATIVE FIBROCYSTIC CHANGES, INCLUDING USUAL DUCT HYPERPLASIA (UDH),                FOCAL ADENOSIS, AND APOCRINE METAPLASIA        -  NEGATIVE FOR ATYPIA OR MALIGNANCY        -  NO SKIN PRESENT        -  NO LYMPH NODE TISSUE IDENTIFIED        -  NORMAL ADIPOSE TISSUE     B) LEFT BREAST, SKIN AND NIPPLE-SPARING TOTAL MASTECTOMY, WITH NEOPLASTIC LESION:        -  SEE FULL SYNOPTIC REPORT BELOW        -  SUMMARY OF SYNOPSIS:              -  INVASIVE DUCTAL CARCINOMA              -  HISTOLOGIC GRADE (JEAN CLAUDE HISTOLOGIC SCORE):                    -  TUBULAR DIFFERENTIATION SCORE 3                    -  NUCLEAR PLEOMORPHISM SCORE 2                    -  MITOTIC RATE SCORE 1                    -  OVERALL GRADE 2 (TOTAL SCORE 6/9)              -  DUCTAL CARCINOMA IN SITU (DCIS):                    -  SOLID PATTERN, INTERMEDIATE NUCLEAR GRADE, NO NECROSIS,                            AND COMPRISES 20% OF FIRST TUMOR NODULE                    -  SOLID PATTERN, INTERMEDIATE NUCLEAR GRADE, NO NECROSIS,                            AND COMPRISES 90% OF SECOND TUMOR NODULE               -  TUMOR IS PRESENT AT 2 SEPARATE FOCI (SEE ABOVE):                    -  INVASIVE DUCTAL CARCINOMA IS 1.5 CM IN GREATEST DIAMETER,                            FIRST TUMOR NODULE, WITH 20% DCIS                    -  DCIS COMPRISES 90% OF SECOND TUMOR  NODULE, WITH FOCUS                            OF INVASION 0.3 CM IN DIAMETER              -  LOBULAR CARCINOMA IN SITU (LCIS): NOT IDENTIFIED              -  NEGATIVE FOR LYMPHOVASCULAR INVASION              -  MICROCALCIFICATIONS: NOT IDENTIFIED              -  ALL SURGICAL MARGINS NEGATIVE FOR INVASIVE CARCINOMA; NEAREST                      MARGIN IS ANTERIOR MARGIN, 1 MM FROM TUMOR              -  ALL SURGICAL MARGINS NEGATIVE FOR DCIS; NEAREST MARGIN IS                      ANTERIOR MARGIN, 1 MM FROM TUMOR              -  5 REGIONAL LYMPH NODES NEGATIVE FOR METASTATIC CARCINOMA                      (4 SENTINEL NODES AND 1 ADDITIONAL NODE)              -  PROLIFERATIVE FIBROCYSTIC CHANGES ALSO PRESENT              -  PREVIOUS CORE BIOPSY SITE AND MAMMOTOME CLIP IDENTIFIED                      IN TUMOR #1              -  TUMOR IS STAGE pT1c(m) AND pN0              -  ADDITIONAL STUDIES:                    -  ESTROGEN RECEPTORS POSITIVE (> 95% OF TUMOR NUCLEI STAIN STRONGLY)                    -  PROGESTERONE RECEPTORS POSITIVE (90% OF TUMOR NUCLEI STAIN STRONGLY)                    -  HER2/DAVON RECEPTORS EQUIVOCAL FOR OVEREXPRESSION (2+ MEMBRANE STAINING)                                                        C) LEFT BREAST SENTINEL LYMPH NODE:        -  4 LYMPH NODES NEGATIVE FOR MALIGNANCY        -  NORMAL ADIPOSE TISSUE    ASSESSMENT:  Nava Goldman is s/p bilateral mastectomy for left breast invasive ductal carcinoma    - Grade II, T1, N0, ER/ME+, HER2- --> pathologic prognostic stage IA    PLAN:  Pathology was discussed  Discussed Oncotype, which will likely be ordered once the HER2 by FISH finally come back.  The HER2 was originally negative 1+ on the core needle biopsy.  Referral placed for medical oncology  Physical activity restrictions per Dr. Medina  Discontinue yearly mammograms  150 minutes of aerobic exercise/week with 2 days of strength training is recommended     - This can improve survival and  decrease recurrence risk  Return to the breast clinic in 1 year, or earlier as needed    Arina Brewster DO  General Surgeon  [facility]  Breast [place] - [address] [address]  Office: [phone]  Employed by - [facility]

## 2023-07-05 NOTE — LETTER
7/5/2023         RE: Nava Goldman  [address] [facility] [address]        Dear Colleague,    Thank you for referring your patient, Nava Goldman, to the [facility] BREAST CLINIC [place]. Please see a copy of my visit note below.    History:  Nava Goldman is s/p bilateral nipple sparing mastectomy and implant replacement on June 22.  She is doing well.  Drains are putting out about 20 cc/day.  The fluid has really lightened and thinned out.    Is planning her trip to [place] on the 16th.    Physical exam:  BREAST: Incisions are covered by gauze which is covered by clear tape over the entirety of the chest.  Nipples are viable and there is good symmetry.  No signs of infection.  Drain output is serous.  Slightly sanguinous on the right side.    Pathology:  A) RIGHT BREAST, SKIN AND NIPPLE-SPARING TOTAL MASTECTOMY:        -  PROLIFERATIVE FIBROCYSTIC CHANGES, INCLUDING USUAL DUCT HYPERPLASIA (UDH),                FOCAL ADENOSIS, AND APOCRINE METAPLASIA        -  NEGATIVE FOR ATYPIA OR MALIGNANCY        -  NO SKIN PRESENT        -  NO LYMPH NODE TISSUE IDENTIFIED        -  NORMAL ADIPOSE TISSUE     B) LEFT BREAST, SKIN AND NIPPLE-SPARING TOTAL MASTECTOMY, WITH NEOPLASTIC LESION:        -  SEE FULL SYNOPTIC REPORT BELOW        -  SUMMARY OF SYNOPSIS:              -  INVASIVE DUCTAL CARCINOMA              -  HISTOLOGIC GRADE (JEAN CLAUDE HISTOLOGIC SCORE):                    -  TUBULAR DIFFERENTIATION SCORE 3                    -  NUCLEAR PLEOMORPHISM SCORE 2                    -  MITOTIC RATE SCORE 1                    -  OVERALL GRADE 2 (TOTAL SCORE 6/9)              -  DUCTAL CARCINOMA IN SITU (DCIS):                    -  SOLID PATTERN, INTERMEDIATE NUCLEAR GRADE, NO NECROSIS,                            AND COMPRISES 20% OF FIRST TUMOR NODULE                    -  SOLID PATTERN, INTERMEDIATE NUCLEAR GRADE, NO NECROSIS,                            AND COMPRISES 90% OF SECOND TUMOR NODULE                -  TUMOR IS PRESENT AT 2 SEPARATE FOCI (SEE ABOVE):                    -  INVASIVE DUCTAL CARCINOMA IS 1.5 CM IN GREATEST DIAMETER,                            FIRST TUMOR NODULE, WITH 20% DCIS                    -  DCIS COMPRISES 90% OF SECOND TUMOR NODULE, WITH FOCUS                            OF INVASION 0.3 CM IN DIAMETER              -  LOBULAR CARCINOMA IN SITU (LCIS): NOT IDENTIFIED              -  NEGATIVE FOR LYMPHOVASCULAR INVASION              -  MICROCALCIFICATIONS: NOT IDENTIFIED              -  ALL SURGICAL MARGINS NEGATIVE FOR INVASIVE CARCINOMA; NEAREST                      MARGIN IS ANTERIOR MARGIN, 1 MM FROM TUMOR              -  ALL SURGICAL MARGINS NEGATIVE FOR DCIS; NEAREST MARGIN IS                      ANTERIOR MARGIN, 1 MM FROM TUMOR              -  5 REGIONAL LYMPH NODES NEGATIVE FOR METASTATIC CARCINOMA                      (4 SENTINEL NODES AND 1 ADDITIONAL NODE)              -  PROLIFERATIVE FIBROCYSTIC CHANGES ALSO PRESENT              -  PREVIOUS CORE BIOPSY SITE AND MAMMOTOME CLIP IDENTIFIED                      IN TUMOR #1              -  TUMOR IS STAGE pT1c(m) AND pN0              -  ADDITIONAL STUDIES:                    -  ESTROGEN RECEPTORS POSITIVE (> 95% OF TUMOR NUCLEI STAIN STRONGLY)                    -  PROGESTERONE RECEPTORS POSITIVE (90% OF TUMOR NUCLEI STAIN STRONGLY)                    -  HER2/DAVON RECEPTORS EQUIVOCAL FOR OVEREXPRESSION (2+ MEMBRANE STAINING)                                                        C) LEFT BREAST SENTINEL LYMPH NODE:        -  4 LYMPH NODES NEGATIVE FOR MALIGNANCY        -  NORMAL ADIPOSE TISSUE    ASSESSMENT:  Nava Goldman is s/p bilateral mastectomy for left breast invasive ductal carcinoma    - Grade II, T1, N0, ER/DC+, HER2- --> pathologic prognostic stage IA    PLAN:  Pathology was discussed  Discussed Oncotype, which will likely be ordered once the HER2 by FISH finally come back.  The HER2 was originally negative 1+ on  the core needle biopsy.  Referral placed for medical oncology  Physical activity restrictions per Dr. Medina  Discontinue yearly mammograms  150 minutes of aerobic exercise/week with 2 days of strength training is recommended     - This can improve survival and decrease recurrence risk  Return to the breast clinic in 1 year, or earlier as needed    Arina Brewster DO  General Surgeon  [facility]  Breast [address] [address]  Office: [phone]  Employed by - [facility] [employer]        Again, thank you for allowing me to participate in the care of your patient.        Sincerely,        Arina Brewster DO

## 2023-07-05 NOTE — PROGRESS NOTES
Per Dr. Brewster'  request, order for Oncotype submitted through [email or web address] online portal.

## 2023-07-07 ENCOUNTER — OFFICE VISIT (OUTPATIENT)
Dept: PLASTIC SURGERY | Facility: AMBULATORY SURGERY CENTER | Age: 58
End: 2023-07-07
Payer: COMMERCIAL

## 2023-07-07 DIAGNOSIS — Z98.890 STATUS POST BILATERAL BREAST RECONSTRUCTION: Primary | ICD-10-CM

## 2023-07-07 PROCEDURE — 99024 POSTOP FOLLOW-UP VISIT: CPT | Performed by: PLASTIC SURGERY

## 2023-07-07 RX ORDER — IBUPROFEN 200 MG
200 TABLET ORAL EVERY 4 HOURS PRN
COMMUNITY

## 2023-07-07 NOTE — PROGRESS NOTES
Cherelle is a 50 years old lady status post direct to implant breast reconstruction in the subpectoral space with 325 cc full profile implants.  She is 15 days out from surgery.  Patient is feeling well.    At the physical exam, all dressing has been removed as well as bilateral Taz drains due to low output, less than 30 cc for the last 3 consecutive days.  All inframammary fold incisions are healing well with no sign of infection or dehiscence or hematoma or seroma.  Good shape and symmetry bilaterally.    Plan: Patient to continue to have regular showers, apply cocoa butter lotion along the scars, continue with sports bra for the next 6 weeks, avoid heavy lifting for the next 4 weeks and follow-up with me next Wednesday.  This patient is going to her native [place] next week Sunday for 4 weeks.  Patient is doing well from plastic surgery standpoint.    Charles Medina MD , FACS   Diplomate American Board of Plastic Surgery  Diplomate American Board of Surgery  Adj. Assistant Professor of Surgery  Division of Plastic & Reconstructive Surgery   [facility]  Office: [phone]   7/7/2023 at 1:20 PM

## 2023-07-07 NOTE — LETTER
7/7/2023         RE: Nava Goldman  [address] [facility] [address]        Dear Colleague,    Thank you for referring your patient, Nava Goldman, to the [facility] PLASTIC SURGERY CLINIC [place]. Please see a copy of my visit note below.    Cherelle is a 50 years old lady status post direct to implant breast reconstruction in the subpectoral space with 325 cc full profile implants.  She is 15 days out from surgery.  Patient is feeling well.    At the physical exam, all dressing has been removed as well as bilateral Taz drains due to low output, less than 30 cc for the last 3 consecutive days.  All inframammary fold incisions are healing well with no sign of infection or dehiscence or hematoma or seroma.  Good shape and symmetry bilaterally.    Plan: Patient to continue to have regular showers, apply cocoa butter lotion along the scars, continue with sports bra for the next 6 weeks, avoid heavy lifting for the next 4 weeks and follow-up with me next Wednesday.  This patient is going to her native [place] next week Sunday for 4 weeks.  Patient is doing well from plastic surgery standpoint.    Charles Medina MD , FACS   Diplomate American Board of Plastic Surgery  Diplomate American Board of Surgery  Adj. Assistant Professor of Surgery  Division of Plastic & Reconstructive Surgery   [facility]  Office: [phone]   7/7/2023 at 1:20 PM         Again, thank you for allowing me to participate in the care of your patient.        Sincerely,        Charles Medina MD

## 2023-07-10 LAB — INTERPRETATION: NORMAL

## 2023-07-11 NOTE — PROGRESS NOTES
Patient has been scheduled to see Dr. Waterman for medical oncology on 8/17. She will be on vacation for a month and will not get back until 8/16. Lorri Galo RN

## 2023-07-12 ENCOUNTER — OFFICE VISIT (OUTPATIENT)
Dept: PLASTIC SURGERY | Facility: AMBULATORY SURGERY CENTER | Age: 58
End: 2023-07-12
Payer: COMMERCIAL

## 2023-07-12 DIAGNOSIS — L03.313 CELLULITIS OF CHEST WALL: ICD-10-CM

## 2023-07-12 DIAGNOSIS — Z98.890 STATUS POST BILATERAL BREAST RECONSTRUCTION: Primary | ICD-10-CM

## 2023-07-12 PROCEDURE — 99024 POSTOP FOLLOW-UP VISIT: CPT | Performed by: PLASTIC SURGERY

## 2023-07-12 RX ORDER — SULFAMETHOXAZOLE/TRIMETHOPRIM 800-160 MG
1 TABLET ORAL 2 TIMES DAILY
Qty: 14 TABLET | Refills: 0 | Status: SHIPPED | OUTPATIENT
Start: 2023-07-12 | End: 2023-07-19

## 2023-07-12 NOTE — PROGRESS NOTES
Mrs. Goldman is a 58 years old lady status post direct to implant breast reconstruction in the subpectoral space.  She is 3 weeks out from surgery.  Overall she is doing well.    At the physical exam, there is no evidence of seroma or hematoma.  Mastectomy incisions in the inframammary fold are healing well with no evidence of infection in those areas or dehiscence.    On the left breast inner lower quadrant there is minimal erythema.  It does not show clear sign of infection but the patient was told me that she has since minimal erythema in the last 2 days.    Plan: Continue with sports bra, continue massage with cocoa butter lotion, and I will prescribe prophylactic antibiotics for the left breast skin redness.    Patient is planning to go to her native [place] this weekend.  The plan is for her to contact me via [employer] if there are any concerns.  At this time however, I do not see contraindications for traveling.    Charles Medina MD , FACS   Diplomate American Board of Plastic Surgery  Diplomate American Board of Surgery  Adj. Assistant Professor of Surgery  Division of Plastic & Reconstructive Surgery   [facility]  Office: [phone]   7/12/2023 at 9:46 AM

## 2023-07-12 NOTE — LETTER
7/12/2023         RE: Nava Goldman  [address] [facility] [address]        Dear Colleague,    Thank you for referring your patient, Nava Goldman, to the [facility] PLASTIC SURGERY CLINIC [place]. Please see a copy of my visit note below.    Mrs. Goldman is a 58 years old lady status post direct to implant breast reconstruction in the subpectoral space.  She is 3 weeks out from surgery.  Overall she is doing well.    At the physical exam, there is no evidence of seroma or hematoma.  Mastectomy incisions in the inframammary fold are healing well with no evidence of infection in those areas or dehiscence.    On the left breast inner lower quadrant there is minimal erythema.  It does not show clear sign of infection but the patient was told me that she has since minimal erythema in the last 2 days.    Plan: Continue with sports bra, continue massage with cocoa butter lotion, and I will prescribe prophylactic antibiotics for the left breast skin redness.    Patient is planning to go to her native [place] this weekend.  The plan is for her to contact me via [employer] if there are any concerns.  At this time however, I do not see contraindications for traveling.    Charles Medina MD , FACS   Diplomate American Board of Plastic Surgery  Diplomate American Board of Surgery  Adj. Assistant Professor of Surgery  Division of Plastic & Reconstructive Surgery   [facility]  Office: [phone]   7/12/2023 at 9:46 AM             Again, thank you for allowing me to participate in the care of your patient.        Sincerely,        Charles Medina MD

## 2023-07-17 LAB
PATH REPORT.ADDENDUM SPEC: ABNORMAL
PATH REPORT.COMMENTS IMP SPEC: ABNORMAL
PATH REPORT.COMMENTS IMP SPEC: YES
PATH REPORT.FINAL DX SPEC: ABNORMAL
PATH REPORT.GROSS SPEC: ABNORMAL
PATH REPORT.MICROSCOPIC SPEC OTHER STN: ABNORMAL
PATH REPORT.RELEVANT HX SPEC: ABNORMAL
PATHOLOGY SYNOPTIC REPORT: ABNORMAL
PHOTO IMAGE: ABNORMAL

## 2023-07-25 LAB — SPECIMEN STATUS: NORMAL

## 2023-08-09 NOTE — TELEPHONE ENCOUNTER
RECORDS STATUS - BREAST    RECORDS REQUESTED FROM: Fay / Gertrude   Appt 8/17/2023 with Dr. Waterman     Invasive ductal carcinoma of breast, female, left    Action    Action Taken 8/15/2023 1:38pm MICHAEL     The [facility] path slides were picked up today by Gwen. I sent a request to [email or web address] back on 8/9/2023 8/17/23 1:40PM received path from gertrude - Amay          NOTES DETAILS STATUS   OFFICE NOTE from referring provider     OFFICE NOTE from surgeon     OFFICE NOTE from radiation oncologist     DISCHARGE SUMMARY from hospital Complete 6/22/2023 Hospital Encounter in [facility]   DISCHARGE REPORT from the ER     OPERATIVE REPORT Complete See Breast Biopsy in EPIC   MEDICATION LIST Complete [facility]   CLINICAL TRIAL TREATMENTS TO DATE     LABS     REQUEST BLOCKS FOR ALL BREAST CANCER PTS     PATHOLOGY REPORTS  (Tissue diagnosis, Stage, ER/ID percentage positive and intensity of staining, HER2 IHC, FISH, and all biopsies from breast and any distant metastasis)                 See internal biopsy in EPIC    External biopsy report in   [facility] Path Dept   Gwen Tracking Number: [id number] 6/22/2023   A) RIGHT BREAST, SKIN AND NIPPLE-SPARING TOTAL MASTECTOMY:            4/24/2023 [facility] Biopsy in [facility]   GENONOMIC TESTING     TYPE:   (Next Generation Sequencing, including Foundation One testing, and Oncotype score) Complete Her 2 [facility]ayne Fish    IMAGING (NEED IMAGES & REPORT)     CT SCANS     MRI     MAMMO Complete- [facility]  4/19/2023, 4/5/2023   ULTRASOUND Complete- [facility] 4/24/2023, 4/19/2023   PET     BONE SCAN     BRAIN MRI

## 2023-08-17 ENCOUNTER — ONCOLOGY VISIT (OUTPATIENT)
Dept: ONCOLOGY | Facility: CLINIC | Age: 58
End: 2023-08-17
Attending: SURGERY
Payer: COMMERCIAL

## 2023-08-17 ENCOUNTER — PRE VISIT (OUTPATIENT)
Dept: ONCOLOGY | Facility: HOSPITAL | Age: 58
End: 2023-08-17
Payer: COMMERCIAL

## 2023-08-17 VITALS
HEART RATE: 76 BPM | HEIGHT: 61 IN | SYSTOLIC BLOOD PRESSURE: 119 MMHG | RESPIRATION RATE: 16 BRPM | TEMPERATURE: 98 F | BODY MASS INDEX: 22.77 KG/M2 | OXYGEN SATURATION: 97 % | DIASTOLIC BLOOD PRESSURE: 77 MMHG | WEIGHT: 120.6 LBS

## 2023-08-17 DIAGNOSIS — C50.912 INVASIVE DUCTAL CARCINOMA OF BREAST, FEMALE, LEFT (H): Primary | ICD-10-CM

## 2023-08-17 DIAGNOSIS — Z78.0 MENOPAUSE: ICD-10-CM

## 2023-08-17 PROCEDURE — G0463 HOSPITAL OUTPT CLINIC VISIT: HCPCS | Performed by: INTERNAL MEDICINE

## 2023-08-17 PROCEDURE — 99205 OFFICE O/P NEW HI 60 MIN: CPT | Performed by: INTERNAL MEDICINE

## 2023-08-17 RX ORDER — ANASTROZOLE 1 MG/1
1 TABLET ORAL DAILY
Qty: 30 TABLET | Refills: 1 | Status: SHIPPED | OUTPATIENT
Start: 2023-08-17 | End: 2023-10-18

## 2023-08-17 NOTE — LETTER
8/17/2023         RE: Nava Goldman  [address] [facility] [address]        Dear Colleague,    Thank you for referring your patient, Nava Goldman, to the [facility] CANCER C[facility] [place]. Please see a copy of my visit note below.    [facility] Hematology and Oncology Consult Note    Patient: Nava Goldman  MRN: [id number]  Date of Service: Aug 17, 2023       Reason for Visit    Chief Complaint   Patient presents with     Oncology Clinic Visit     Invasive ductal carcinoma of breast, female, left         Assessment/Plan    ECOG Performance 0 - Independent    #.  Stage IA (pT1c N0 M0) invasive ductal carcinoma of the upper-outer quadrant of the left breast, ER strongly positive, MN strongly positive, HER2 negative by FISH.  Oncotype DX 10/3% / <1%.  #.  Post menopause     I reviewed the clinical course and pathology result in detail.  I informed her that she has stage I early stage hormone receptor positive, HER2 negative breast cancer.  Treatment goal is for cure.  She completed surgery.  I discussed about adjuvant therapy.  Based on her Oncotype, adjuvant chemotherapy will not benefit.  I offered adjuvant endocrine therapy given her hormone receptor positive breast cancer.  I reviewed 2 options of aromatase inhibitor and tamoxifen.  Since she is postmenopausal, I preferred aromatase inhibitor.  I reviewed the side effects and schedule of anastrozole.  She agreed to start.  Prescription sent.   I also discussed about bone density monitoring while she is on anastrozole.  She has not had DEXA scan recently.  DEXA scan ordered.  I advised her to start calcium/vitamin D daily requirements and incorporate weightbearing exercises for bone health.   Follow-up in about 3 months to assess tolerability/toxicity of medication.  She is advised to call me sooner with any concerns.    Encounter Diagnoses:    Problem List Items Addressed This Visit    None  Visit Diagnoses       Invasive  ductal carcinoma of breast, female, left (H)    -  Primary    Relevant Medications    anastrozole (ARIMIDEX) 1 MG tablet    Menopause        Relevant Orders    DX Hip/Pelvis/Spine (Completed)            ______________________________________________________________________________    Staging History   Cancer Staging   No matching staging information was found for the patient.      History    Ms. Nava Goldman is a very pleasant 58 year old female presented today accompanied by her , Juice for further management of recent diagnosis of breast cancer.  Her oncologic history is as follows.  4/2023-screening mammogram detected a new asymmetry in the left breast.  Subsequent biopsy confirmed invasive ductal carcinoma, grade 2, ER positive, FL positive, HER2 negative (1+ by IHC), Ki-67 22%..  6/22/2023-underwent left breast total mastectomy and left axillary sentinel lymph node biopsy, right breast total mastectomy.   -Left breast final pathology showed invasive ductal carcinoma, grade 2.  Tumor was present at 2 separate foci with invasive ductal carcinoma of 1.5 cm with 20% DCIS in the first tumor nodule and DCIS with focus of invasion 0.3 cm in the second nodule.  Margins were negative.  4 sentinel lymph nodes and 1 additional lymph nodes were negative for metastatic carcinoma. pT1c(m) p N0.  ER strongly positive, FL strongly positive, HER2 2+ by IHC and negative by FISH.  Oncotype DX 10/3% / <1%.    She was on medication for a month after that.    Today, she reported she does not have any concerns today.  No pain or discomfort.  She is recovering from surgery well.  She is following with Dr. Medina from plastic surgery.    LMP at age 51.  G2, P2.  She has 2 sons.    Family history is significant for breast cancer in her sister in her 30s.    Review of systems.  Apart from describing in history, the remainder of comprehensive ROS was negative.    Past History    Past Medical History:   Diagnosis Date     Breast  "cancer (H)     Infiltrating ductal carcunoma     Past Surgical History:   Procedure Laterality Date     BIOPSY NODE SENTINEL Left 6/22/2023    Procedure: LEFT SENTINEL LYMPH NODE BIOPSY;  Surgeon: Arina Brewster DO;  [place] [facility] [place]     BREAST BIOPSY, RT/LT Left      MAMMOPLASTY AUGMENTATION       MASTECTOMY SIMPLE Bilateral 6/22/2023    Procedure: BILATERAL NIPPLE SPARING MASTECTOMY WITH;  Surgeon: Arina Brewster DO;  [place] [facility] [place]     RECONSTRUCT BREAST, IMPLANT PROSTHESIS, COMBINED Bilateral 6/22/2023    Procedure: RECONSTRUCTION, BREAST, IMMEDIATE PERMANENT BREAST IMPLANT, BILATERAL CAPSULE ORIFICE;  Surgeon: Charles Medina MD;  [place] [facility] [place]     No family history on file.  Social History     Socioeconomic History     Marital status:    Tobacco Use     Smoking status: Never     Passive exposure: Never     Smokeless tobacco: Never   Substance and Sexual Activity     Alcohol use: Never     Drug use: Never       Allergies    No Known Allergies    Physical Exam    /77 (Patient Position: Sitting)   Pulse 76   Temp 98  F (36.7  C) (Oral)   Resp 16   Ht 1.549 m (5' 1\")   Wt 54.7 kg (120 lb 9.6 oz)   SpO2 97%   BMI 22.79 kg/m      General: alert, awake, not in acute distress  HEENT: Head: Normal, normocephalic, atraumatic.  Eye: Normal external eye, conjunctiva, lids cornea, VINCENZO.  Nose: Normal external nose, mucus membranes and septum.  Pharynx: Normal buccal mucosa. Normal pharynx.  Neck / Thyroid: Supple, no masses, nodes, nodules or enlargement.  Extremities: normal strength, tone, and muscle mass  Skin: normal. no rash or abnormalities  CNS: non focal.    Lab Results    No results found for this or any previous visit (from the past 168 hour(s)).    Imaging Results    DX Hip/Pelvis/Spine    Result Date: 8/22/2023  EXAM: DX HIP/PELVIS/SPINE LOCATION: [facility] DATE: 8/21/2023 INDICATION: BMD screening. Baseline. History of aromatase " inhibitor use (Arimidex). DEMOGRAPHICS: Age- 58 years. Gender- Female. Menopausal status- Postmenopausal. COMPARISON: None. TECHNIQUE: Dual-energy x-ray absorptiometry (DXA) performed with routine technique.   FINDINGS: DXA RESULTS -Lumbar Spine: L1-L4: BMD: 0.991 g/cm2. T-score: -1.6. Z-score: -0.1. -RIGHT Hip Total: BMD: 0.895 g/cm2. T-score: -0.9. Z-score: 0.3. -RIGHT Hip Femoral neck: BMD: 0.827 g/cm2. T-score: -1.5. Z-score: -0.1. -LEFT Hip Total: BMD: 0.931 g/cm2. T-score: -0.6. Z-score: 0.5. -LEFT Hip Femoral neck: BMD: 0.854 g/cm2. T-score: -1.3. Z-score: 0.1. WHO T-SCORE CRITERIA -Normal: T score at or above -1 SD -Osteopenia: T score between -1 and -2.5 SD -Osteoporosis: T score at or below -2.5 SD The World Health Organization (WHO) criteria is applicable to perimenopausal females, postmenopausal females, and men aged 50 years or older. TBS RESULTS -Lumbar Spine L1-L4: TBS: 1.257. TBS T-score: -2.3.TBS Z-score: -0.6. The TBS is a DXA derived measurement for fracture risk assessment, and reflects the structural condition of the bone microarchitecture. It can be used to adjust WHO Fracture Risk Assessment Tool (FRAX) probability of fracture in postmenopausal women and older men. The calculated probabilities of fracture have been shown to be more accurate when computed with the TBS. FRACTURE RISK -FRAX Results: The 10 year probability of major osteoporotic fracture is 7.5%, and of hip fracture is 0.7%, based on right femoral neck BMD. -TBS-adjusted FRAX Results: The 10 year probability of major osteoporotic fracture is 8.5%, and of hip fracture is 0.8%. RECOMMENDATIONS Consider treatment if major osteoporotic fracture score is greater than or equal to 20%, and if the hip fracture score is greater than or equal to 3%.     IMPRESSION: Low bone density (OSTEOPENIA). T score meets the WHO criteria for low bone density (osteopenia) at one or more measured sites. The risk of osteoporotic fracture increases  "approximately two-fold for each standard deviation decrease in T-score.     60 minutes spent on the date of the encounter doing chart review, history and exam, documentation, communication of the treatment plan with the care team and further activities as noted above.    Signed by: Fransico Waterman MD       Oncology Rooming Note    August 17, 2023 11:13 AM   Nava Goldman is a 58 year old female who presents for:    Chief Complaint   Patient presents with     Oncology Clinic Visit     Invasive ductal carcinoma of breast, female, left     Initial Vitals: /77 (Patient Position: Sitting)   Pulse 76   Temp 98  F (36.7  C) (Oral)   Resp 16   Ht 1.549 m (5' 1\")   Wt 54.7 kg (120 lb 9.6 oz)   SpO2 97%   BMI 22.79 kg/m   Estimated body mass index is 22.79 kg/m  as calculated from the following:    Height as of this encounter: 1.549 m (5' 1\").    Weight as of this encounter: 54.7 kg (120 lb 9.6 oz). Body surface area is 1.53 meters squared.  Data Unavailable Comment: Data Unavailable   No LMP recorded. Patient is postmenopausal.  Allergies reviewed: Yes  Medications reviewed: Yes    Medications: Medication refills not needed today.  Pharmacy name entered into [facility]: [facility] - [address]    Clinical concerns: Consultation.       Lyndsey Nicholas LPN              Again, thank you for allowing me to participate in the care of your patient.        Sincerely,        Fransico Waterman MD  "

## 2023-08-17 NOTE — PROGRESS NOTES
"Oncology Rooming Note    August 17, 2023 11:13 AM   Nava Goldman is a 58 year old female who presents for:    Chief Complaint   Patient presents with    Oncology Clinic Visit     Invasive ductal carcinoma of breast, female, left     Initial Vitals: /77 (Patient Position: Sitting)   Pulse 76   Temp 98  F (36.7  C) (Oral)   Resp 16   Ht 1.549 m (5' 1\")   Wt 54.7 kg (120 lb 9.6 oz)   SpO2 97%   BMI 22.79 kg/m   Estimated body mass index is 22.79 kg/m  as calculated from the following:    Height as of this encounter: 1.549 m (5' 1\").    Weight as of this encounter: 54.7 kg (120 lb 9.6 oz). Body surface area is 1.53 meters squared.  Data Unavailable Comment: Data Unavailable   No LMP recorded. Patient is postmenopausal.  Allergies reviewed: Yes  Medications reviewed: Yes    Medications: Medication refills not needed today.  Pharmacy name entered into [email or web address]: [facility] - [address]    Clinical concerns: Consultation.       Lyndsey Nicholas LPN            "

## 2023-08-17 NOTE — PROGRESS NOTES
[facility] [place] Hematology and Oncology Consult Note    Patient: Nava Goldman  MRN: [id number]  Date of Service: Aug 17, 2023       Reason for Visit    Chief Complaint   Patient presents with    Oncology Clinic Visit     Invasive ductal carcinoma of breast, female, left         Assessment/Plan    ECOG Performance 0 - Independent    #.  Stage IA (pT1c N0 M0) invasive ductal carcinoma of the upper-outer quadrant of the left breast, ER strongly positive, NH strongly positive, HER2 negative by FISH.  Oncotype DX 10/3% / <1%.  #.  Post menopause     I reviewed the clinical course and pathology result in detail.  I informed her that she has stage I early stage hormone receptor positive, HER2 negative breast cancer.  Treatment goal is for cure.  She completed surgery.  I discussed about adjuvant therapy.  Based on her Oncotype, adjuvant chemotherapy will not benefit.  I offered adjuvant endocrine therapy given her hormone receptor positive breast cancer.  I reviewed 2 options of aromatase inhibitor and tamoxifen.  Since she is postmenopausal, I preferred aromatase inhibitor.  I reviewed the side effects and schedule of anastrozole.  She agreed to start.  Prescription sent.   I also discussed about bone density monitoring while she is on anastrozole.  She has not had DEXA scan recently.  DEXA scan ordered.  I advised her to start calcium/vitamin D daily requirements and incorporate weightbearing exercises for bone health.   Follow-up in about 3 months to assess tolerability/toxicity of medication.  She is advised to call me sooner with any concerns.    Encounter Diagnoses:    Problem List Items Addressed This Visit    None  Visit Diagnoses       Invasive ductal carcinoma of breast, female, left (H)    -  Primary    Relevant Medications    anastrozole (ARIMIDEX) 1 MG tablet    Menopause        Relevant Orders    DX Hip/Pelvis/Spine (Completed)             ______________________________________________________________________________    Staging History   Cancer Staging   No matching staging information was found for the patient.      History    Ms. Nava Goldman is a very pleasant 58 year old female presented today accompanied by her , Juice for further management of recent diagnosis of breast cancer.  Her oncologic history is as follows.  4/2023-screening mammogram detected a new asymmetry in the left breast.  Subsequent biopsy confirmed invasive ductal carcinoma, grade 2, ER positive, CO positive, HER2 negative (1+ by IHC), Ki-67 22%..  6/22/2023-underwent left breast total mastectomy and left axillary sentinel lymph node biopsy, right breast total mastectomy.   -Left breast final pathology showed invasive ductal carcinoma, grade 2.  Tumor was present at 2 separate foci with invasive ductal carcinoma of 1.5 cm with 20% DCIS in the first tumor nodule and DCIS with focus of invasion 0.3 cm in the second nodule.  Margins were negative.  4 sentinel lymph nodes and 1 additional lymph nodes were negative for metastatic carcinoma. pT1c(m) p N0.  ER strongly positive, CO strongly positive, HER2 2+ by IHC and negative by FISH.  Oncotype DX 10/3% / <1%.    She was on medication for a month after that.    Today, she reported she does not have any concerns today.  No pain or discomfort.  She is recovering from surgery well.  She is following with Dr. Medina from plastic surgery.    LMP at age 51.  G2, P2.  She has 2 sons.    Family history is significant for breast cancer in her sister in her 30s.    Review of systems.  Apart from describing in history, the remainder of comprehensive ROS was negative.    Past History    Past Medical History:   Diagnosis Date    Breast cancer (H)     Infiltrating ductal carcunoma     Past Surgical History:   Procedure Laterality Date    BIOPSY NODE SENTINEL Left 6/22/2023    Procedure: LEFT SENTINEL LYMPH NODE BIOPSY;  Surgeon:  "Arina Brewster DO;  [place] [facility] [place]    BREAST BIOPSY, RT/LT Left     MAMMOPLASTY AUGMENTATION      MASTECTOMY SIMPLE Bilateral 6/22/2023    Procedure: BILATERAL NIPPLE SPARING MASTECTOMY WITH;  Surgeon: Arina Brewster DO;  [place] [facility] [place]    RECONSTRUCT BREAST, IMPLANT PROSTHESIS, COMBINED Bilateral 6/22/2023    Procedure: RECONSTRUCTION, BREAST, IMMEDIATE PERMANENT BREAST IMPLANT, BILATERAL CAPSULE ORIFICE;  Surgeon: Charles Medina MD;  [place] [facility] [place]     No family history on file.  Social History     Socioeconomic History    Marital status:    Tobacco Use    Smoking status: Never     Passive exposure: Never    Smokeless tobacco: Never   Substance and Sexual Activity    Alcohol use: Never    Drug use: Never       Allergies    No Known Allergies    Physical Exam    /77 (Patient Position: Sitting)   Pulse 76   Temp 98  F (36.7  C) (Oral)   Resp 16   Ht 1.549 m (5' 1\")   Wt 54.7 kg (120 lb 9.6 oz)   SpO2 97%   BMI 22.79 kg/m      General: alert, awake, not in acute distress  HEENT: Head: Normal, normocephalic, atraumatic.  Eye: Normal external eye, conjunctiva, lids cornea, VINCENZO.  Nose: Normal external nose, mucus membranes and septum.  Pharynx: Normal buccal mucosa. Normal pharynx.  Neck / Thyroid: Supple, no masses, nodes, nodules or enlargement.  Extremities: normal strength, tone, and muscle mass  Skin: normal. no rash or abnormalities  CNS: non focal.    Lab Results    No results found for this or any previous visit (from the past 168 hour(s)).    Imaging Results    DX Hip/Pelvis/Spine    Result Date: 8/22/2023  EXAM: DX HIP/PELVIS/SPINE LOCATION: [facility] DATE: 8/21/2023 INDICATION: BMD screening. Baseline. History of aromatase inhibitor use (Arimidex). DEMOGRAPHICS: Age- 58 years. Gender- Female. Menopausal status- Postmenopausal. COMPARISON: None. TECHNIQUE: Dual-energy x-ray absorptiometry (DXA) performed with routine " technique.   FINDINGS: DXA RESULTS -Lumbar Spine: L1-L4: BMD: 0.991 g/cm2. T-score: -1.6. Z-score: -0.1. -RIGHT Hip Total: BMD: 0.895 g/cm2. T-score: -0.9. Z-score: 0.3. -RIGHT Hip Femoral neck: BMD: 0.827 g/cm2. T-score: -1.5. Z-score: -0.1. -LEFT Hip Total: BMD: 0.931 g/cm2. T-score: -0.6. Z-score: 0.5. -LEFT Hip Femoral neck: BMD: 0.854 g/cm2. T-score: -1.3. Z-score: 0.1. WHO T-SCORE CRITERIA -Normal: T score at or above -1 SD -Osteopenia: T score between -1 and -2.5 SD -Osteoporosis: T score at or below -2.5 SD The World Health Organization (WHO) criteria is applicable to perimenopausal females, postmenopausal females, and men aged 50 years or older. TBS RESULTS -Lumbar Spine L1-L4: TBS: 1.257. TBS T-score: -2.3.TBS Z-score: -0.6. The TBS is a DXA derived measurement for fracture risk assessment, and reflects the structural condition of the bone microarchitecture. It can be used to adjust WHO Fracture Risk Assessment Tool (FRAX) probability of fracture in postmenopausal women and older men. The calculated probabilities of fracture have been shown to be more accurate when computed with the TBS. FRACTURE RISK -FRAX Results: The 10 year probability of major osteoporotic fracture is 7.5%, and of hip fracture is 0.7%, based on right femoral neck BMD. -TBS-adjusted FRAX Results: The 10 year probability of major osteoporotic fracture is 8.5%, and of hip fracture is 0.8%. RECOMMENDATIONS Consider treatment if major osteoporotic fracture score is greater than or equal to 20%, and if the hip fracture score is greater than or equal to 3%.     IMPRESSION: Low bone density (OSTEOPENIA). T score meets the WHO criteria for low bone density (osteopenia) at one or more measured sites. The risk of osteoporotic fracture increases approximately two-fold for each standard deviation decrease in T-score.     60 minutes spent on the date of the encounter doing chart review, history and exam, documentation, communication of the  treatment plan with the care team and further activities as noted above.    Signed by: Fransico Waterman MD

## 2023-08-21 ENCOUNTER — LAB REQUISITION (OUTPATIENT)
Dept: LAB | Facility: CLINIC | Age: 58
End: 2023-08-21
Payer: COMMERCIAL

## 2023-08-21 ENCOUNTER — ANCILLARY PROCEDURE (OUTPATIENT)
Dept: BONE DENSITY | Facility: CLINIC | Age: 58
End: 2023-08-21
Attending: INTERNAL MEDICINE
Payer: COMMERCIAL

## 2023-08-21 DIAGNOSIS — Z78.0 MENOPAUSE: ICD-10-CM

## 2023-08-21 PROCEDURE — 77080 DXA BONE DENSITY AXIAL: CPT | Mod: TC | Performed by: PHYSICIAN ASSISTANT

## 2023-08-21 PROCEDURE — 88321 CONSLTJ&REPRT SLD PREP ELSWR: CPT | Performed by: PATHOLOGY

## 2023-08-23 NOTE — PROGRESS NOTES
8/24/2023    Virtual Visit Details  Type of service:  Video Visit   Originating Location (pt. Location): Home  Distant Location (provider location):  Off-site  Platform used for Video Visit: Cleve    Referring Provider: Arina Brewster DO     Presenting Information:   I met with Nava Goldman, along with her  Juice,  today for her video genetic counseling visit through the Cancer Risk Management Program to discuss her personal history of breast cancer and family history of breast cancer. She is here today to review this history, cancer screening recommendations, and available genetic testing options.    Personal History:  Nava is a 58 year old female. She was recently diagnosed with breast cancer. On 4/5/2023, a screening mammogram found focal asymmetry in the left breast. A diagnostic mammogram and ultrasound on 4/19/2023 showed a spiculated mass. On 4/24/2023, a biopsy revealed invasive ductal carcinoma (ER/RI +, HER2 -) with associated DCIS. Nava underwent a bilateral mastectomy on 6/22/2023.     Nava already had some genetic testing ordered by Dr. Brewster via the Breast Actionable Panel offered by the [facility] at [facility]. Nava is negative for mutations in the CIARA, BARD1, BRCA1, BRCA2, CDH1, CHEK2, NF1, PALB2, PTEN, STK11, and TP53 genes by sequencing and deletion/duplication analysis. No mutations were found in any of the 11 genes analyzed. We reviewed her negative results in detail today.     In her hormonal-based medical history, she had her first menstrual period at age 12 or 13, her first child at age 25, and underwent menopause at age 51. Nava has her ovaries, fallopian tubes and uterus in place, and reports no ovarian cancer screening to date. She reports no history of hormone replacement therapy. Nava reports oral contraceptive use for a couple months. She began having colonoscopies at the age of 49. Her most recent colonoscopy in March 2015 was  normal and follow-up was recommended in 5 years. Nava denies any history of dermatological exams. She does not regularly do any other cancer screening at this time. Nava reported no history smoking and no current alcohol use.    Family History: (Please see scanned pedigree for detailed family history information)  Nava's sister was diagnosed with breast cancer in her 30's/40's. It is unknown if her sister had any genetic testing. She is currently 56.   Maternal half aunt (Nava's mother's paternal half sister) was diagnosed with breast cancer in her 70's. She is currently in her late 70's.   Of note, there is no reported history of cancer on her paternal side of her family.   Her maternal and paternal ethnicity is Chinese. There is no known Ashkenazi Muslim ancestry on either side of her family. There is no reported consanguinity.    Discussion:  Nava's personal history of breast cancer and family history of breast cancer is suggestive of a hereditary cancer syndrome.  We reviewed the features of sporadic, familial, and hereditary cancers.   The vast majority of cancers are considered sporadic and not primarily due to an inherited factor. Individuals can develop cancer due to aging, chance events, environmental exposures or lifestyles.  Features typically seen in high risk families include: cancers diagnosed under age 50, multiple relatives with similar cancers on the same side of the family, cancers in multiple generations, and relatives with certain rare cancers (i.e., male breast cancer).   We discussed the natural history and genetics of several hereditary cancer syndromes, including Hereditary Breast and Ovarian Cancer Syndrome (HBOC).   The most common cause of hereditary breast and ovarian cancer is HBOC, which is caused by mutations in the BRCA1 and BRCA2 genes. Individuals with HBOC syndrome are at increased risk for several different cancers, including breast, ovarian, male breast, prostate,  melanoma, and pancreatic cancer. HBOC typically presents with multiple family members diagnosed with breast cancer before age 50 and/or ovarian cancer.   A detailed handout regarding information about HBOC and related hereditary cancer syndromes will be provided to Nava via [facility] and can be found in the after visit summary. Topics included: inheritance pattern, cancer risks, cancer screening recommendations, and also risks, benefits and limitations of testing.  In looking at Nava's personal and family history, it is possible that a cancer susceptibility gene is present due to her personal history of breast cancer at age 58 and her sister diagnosed with breast cancer in her 30's/40's.  Based on her personal and family history, Nava meets current National Comprehensive Cancer Network (NCCN) criteria for genetic testing of high-penetrance breast cancer susceptibility genes (including BRCA1, BRCA2, CDH1, PALB2, PTEN, and TP53).   We reviewed Nava's negative genetic testing results for the Breast Actionable panel ordered through [facility].   Nava was negative for mutations in the CIARA, BARD1, BRCA1, BRCA2, CDH1, CHEK2, NF1, PALB2, PTEN, STK11, and TP53 genes by sequencing and deletion/duplication analysis. No mutations were found in any of the 11 genes analyzed.   We discussed that Nava did not pass on an identifiable mutation in these genes to her children based on this test result.  Mutations in these genes do not skip generations.    Nava's previous testing was a more limited breast cancer panel. There are several other breast cancer susceptibility genes that weren't included in her testing. It is possible that a mutation could be identified in Nava in one of these other breast cancer associated genes. Therefore, it would be appropriate for Nava to consider more comprehensive genetic testing related to genes associated with breast cancer to better  understand her risk for hereditary cancer.  We reviewed genetic testing options for Nava based on her personal and family history: a panel of genes associated with an increased risk for hereditary breast and gynecologic cancers, or larger panel options to include genes associated with increased risk for multiple different cancer types. Nava expressed interest in the GYN Expanded panel through [facility].   Genetic testing is available for 25 genes associated with increased risk for breast and gynecological cancers: Gyn Expanded Panel: (CIARA, BARD1, BRCA1, BRCA2, BRIP1, CDH1, CHEK2, DICER1, EPCAM, MLH1, MRE11, MSH2, MSH6, MUTYH, NBN, NF1, PALB2, PMS2, PTEN, RAD50, RAD51C, RAD51D, SMARCA4, STK11 and TP53)  We discussed that some of the genes in the Gyn Expanded Panel are associated with specific hereditary cancer syndromes and published management guidelines: Hereditary Breast and Ovarian Cancer syndrome (BRCA1, BRCA2), Wellington syndrome (MLH1, MSH2, MSH6, PMS2, EPCAM), Peutz-Jeghers syndrome (STK11), Hereditary Diffuse Gastric Cancer (CDH1), Cowden syndrome (PTEN), Li Fraumeni syndrome (TP53), MUTYH Associated Polyposis (MUTYH), and Neurofibromatosis type 1 (NF1).   Risk-reducing salpingo-oophorectomy can be considered in women with mutations in BRIP1, RAD51C, or RAD51D.  Breast and/or other cancer risk management guidelines are available for CIARA, CHEK2, PALB2, NF1, and NBN.  The remaining genes (BARD1, DICER1, RAD50, and SMARCA4) are associated with increased cancer risk and may allow us to make medical recommendations when mutations are identified.  Nava's blood has already been drawn for testing and is currently at the lab.  Verbal consent was given over video and written on the consent form. Turnaround time is approximately 3-4 weeks.  Medical Management: For Nava, we reviewed that the information from genetic testing may determine:  additional cancer screening for  which Nava may qualify (i.e. more frequent colonoscopies, more frequent dermatologic exams, etc.),  options for risk reducing surgeries Nava could consider (i.e. surgery to remove her ovaries and/or uterus, etc.),    and targeted chemotherapies if she were to develop certain cancers in the future (i.e. immunotherapy for individuals with Wellington syndrome, PARP inhibitors, etc.).   These recommendations and possible targeted chemotherapies will be discussed in detail once genetic testing is completed.       Plan:  1) Today Nava elected to proceed with additional genetic testing via the GYN Expanded panel through [facility]. Therefore, consent was reviewed and verbally obtained for this testing.  2) Her blood has already been drawn and is currently at the lab.    3) The results should be available in 3-4 weeks.  4) Nava will either have a telephone visit or video visit to discuss the results.  Additional recommendations about screening will be made at that time.    Nava is 58 year old and is being evaluated via a billable video visit.    Time spent on video: 33 minutes    Rama Nava MS, [facility]  Licensed, Certified Genetic Counselor  Phone: [phone]

## 2023-08-24 ENCOUNTER — VIRTUAL VISIT (OUTPATIENT)
Dept: ONCOLOGY | Facility: CLINIC | Age: 58
End: 2023-08-24
Attending: SURGERY
Payer: COMMERCIAL

## 2023-08-24 DIAGNOSIS — C50.912 INVASIVE DUCTAL CARCINOMA OF BREAST, LEFT (H): Primary | ICD-10-CM

## 2023-08-24 DIAGNOSIS — Z80.3 FAMILY HISTORY OF MALIGNANT NEOPLASM OF BREAST: ICD-10-CM

## 2023-08-24 PROCEDURE — 96040 HC GENETIC COUNSELING, EACH 30 MINUTES: CPT | Mod: GT,95

## 2023-08-24 NOTE — Clinical Note
"    8/24/2023         RE: Nava Goldman  [address] [facility] [address]        Dear Colleague,    Thank you for referring your patient, Nava Goldman, to the [facility]. Please see a copy of my visit note below.    8/24/2023    Virtual Visit Details    Type of service:  Video Visit     Originating Location (pt. Location): {video visit patient location:038018::\"Home\"}  {PROVIDER LOCATION On-site should be selected for visits conducted from your clinic location or adjoining [facility] hospital, academic office, or other nearby [facility] building. Off-site should be selected for all other provider locations, including home:627472}  Distant Location (provider location):  {virtual location provider:202135}  Platform used for Video Visit: {Virtual Visit Platforms:684364::\"[employer]\"}    Referring Provider: Arina Brewster DO     Presenting Information:   I met with Nava Goldman today for her video genetic counseling visit through the [facility] to discuss her personal history of breast cancerand family history of *** cancer. She is here today to review this history, cancer screening recommendations, and available genetic testing options.    Personal History:  Nava is a 58 year old female. She was diagnosed with breast cancer. ***    invasive ductal carcinoma   Bilateral mastectomy 6/22    Nava already had some genetic testing ordered by Dr. Brewster via the Breast Actionable Panel offered by the [facility] at [facility]. Nava is negative for mutations in the CIARA, BARD1, BRCA1, BRCA2, CDH1, CHEK2, NF1, PALB2, PTEN, STK11, and TP53 genes by sequencing and deletion/duplication analysis. No mutations were found in any of the 11 genes analyzed. We reviewed her negative results in detail today.     In her hormonal-based medical history, she had her first menstrual period at age ***, her first child at age ***/does not have biological " children, and underwent menopause at age ***. Nava has her ovaries, fallopian tubes and uterus in place, and reports no ovarian cancer screening to date. She reports no history of hormone replacement therapy. Nava reports oral contraceptive use for approximately *** years. She began having colonoscopies at the age of ***/Nava has not had a colonoscopy. Her most recent colonoscopy in March 2015 was normal and follow-up was recommended in *** years. Nava denies any history of dermatological exams. She does not regularly do any other cancer screening at this time. Nava reported smoking for approximately *** years and *** alcohol use.    Family History: (Please see scanned pedigree for detailed family history information)  Nava's sister was diagnosed with breast cancer in her 40's.   ***  Her maternal ethnicity is ***. Her paternal ethnicity is ***.  There is no known Ashkenazi Tenriism ancestry on either side of her family. There is no reported consanguinity.    Discussion:  Nava's personal history of breast cancer and family history of *** breast and ovarian cancer is suggestive of a hereditary cancer syndrome.  We reviewed the features of sporadic, familial, and hereditary cancers. We discussed the natural history and genetics of several hereditary cancer syndromes, including Hereditary Breast and Ovarian Cancer Syndrome (HBOC).   The most common cause of hereditary breast and ovarian cancer is HBOC, which is caused by mutations in the BRCA1 and BRCA2 genes. Individuals with HBOC syndrome are at increased risk for several different cancers, including breast, ovarian, male breast, prostate, melanoma, and pancreatic cancer. HBOC typically presents with multiple family members diagnosed with breast cancer before age 50 and/or ovarian cancer.   A detailed handout regarding information about HBOC and related hereditary cancer syndromes will be provided to Nava via [employer] and can be found in the  after visit summary. Topics included: inheritance pattern, cancer risks, cancer screening recommendations, and also risks, benefits and limitations of testing.  In looking at Nava's personal and family history, it is possible that a cancer susceptibility gene is present due ***.  Based on her personal and family history, Nava meets current National Comprehensive Cancer Network (NCCN) criteria for genetic testing of high-penetrance breast cancer susceptibility genes (including BRCA1, BRCA2, CDH1, PALB2, PTEN, and TP53).   Based on her personal and family history, Nava meets current National Comprehensive Cancer Network (NCCN) criteria for genetic testing of high-penetrance ovarian cancer susceptibility genes (including BRCA1, BRCA2, BRIP1, MLH1, MSH2, MSH6, PMS2, EPCAM, PALB2, RAD51C,and  RAD51D).   Nava qualifies for genetic testing due to her breast cancer diagnoses. The American Society of Breast Surgeons Consensus Guideline on Genetic Testing for Hereditary Breast Cancer state that genetic testing should be available to all patients who have been diagnosed with breast cancer. The guidelines state that testing should include BRCA1, BRCA2, and PALB2, and that additional testing may be warranted based on personal and/or family history.   We reviewed Nava's negative genetic testing results for the Breast Actionable panel ordered through [facility].   She was negative for mutations in the CIARA, BARD1, BRCA1, BRCA2, CDH1, CHEK2, NF1, PALB2, PTEN, STK11, and TP53 genes by sequencing and deletion/duplication analysis. No mutations were found in any of the 11 genes analyzed.   We discussed that Nava ***cannot/did not pass on an identifiable mutation in these genes to her children based on this test result.  Mutations in these genes do not skip generations.    Nava's previous testing was a more limited breast cancer panel. There are several other breast cancer  susceptibility genes that weren't included in her testing. It is possible that a mutation could be identified in Nava in one of these other breast cancer associated genes. Therefore, it would be appropriate for Nava to consider more comprehensive genetic testing related to genes associated with breast cancer to better understand her risk for hereditary cancer.  We reviewed genetic testing options for Nava based on her personal and family history: a panel of genes associated with an increased risk for ***hereditary breast and gynecologic cancers/colorectal cancers, or larger panel options to include genes associated with increased risk for multiple different cancer types. Nava expressed interest in the ***GYN Expanded panel/Hereditary Cancer Comprehensive 40-gene panel through [facility].   Cecilias blood has already been drawn for testing and is currently at the lab.  Verbal consent was given over ***video/phone and written on the consent form. Turnaround time is approximately 4-6 weeks.  Medical Management: For Nava, we reviewed that the information from genetic testing may determine:  ***surgery to treat Nava's active cancer diagnosis (i.e. lumpectomy versus bilateral mastectomy, partial versus total colectomy, etc.***),  additional cancer screening for which Nava may qualify (i.e. mammogram and breast MRI, more frequent colonoscopies, more frequent dermatologic exams, etc.***),  options for risk reducing surgeries Nava could consider (i.e. bilateral mastectomy, surgery to remove her ovaries and/or uterus, etc.***),    and targeted chemotherapies for Cecilias *** active cancer, or ***if she were to develop certain cancers in the future (i.e. immunotherapy for individuals with Wellington syndrome, PARP inhibitors, etc.***).   These recommendations and possible targeted chemotherapies will be discussed in detail once genetic testing is completed.       NO  additional testing  Plan:  1) Nava elected not to pursue additional genetic testing at this time.   2) Information regarding recommended screening, based upon the family history, was reviewed for Nava.  3) Nava will contact me regularly and/or if the family or personal history changes. My contact information was provided.     Additional testing  Plan:  1) Today Nava elected to proceed with additional genetic testing via the ***Hereditary Cancer Comprehensive 40-gene panel through [facility]. Therefore, consent was reviewed and verbally obtained for this testing.  2) Her blood has already been drawn and is currently at the lab.    3) The results should be available in 4-6 weeks.  4) Nava will either have a telephone visit or video visit to discuss the results.  Additional recommendations about screening will be made at that time.    Nava is 58 year old and is being evaluated via a billable video visit.    Time spent on video: *** minutes    Rama Nava MS, [facility] [place]  Licensed, Certified Genetic Counselor  Phone: [phone]        Again, thank you for allowing me to participate in the care of your patient.        Sincerely,        Rama Nava GC

## 2023-08-24 NOTE — PATIENT INSTRUCTIONS
Assessing Cancer Risk  Cancer is a common diagnosis which impacts many families.  Individuals may develop cancer due to environmental factors (such as exposures and lifestyle), aging, genetic predisposition, or a combination of these factors.      Only about 5-10% of cancers are thought to be due to an inherited cancer susceptibility gene.    These families often have:  Several people with the same or related types of cancer  Cancers diagnosed at a young age (before age 50)  Individuals with more than one primary cancer  Multiple generations of the family affected with cancer    Comprehensive Breast and Gynecologic Cancer Panel  We each inherit two copies of every gene in our bodies: one from our mother, and one from our father. Each gene has a specific job to do.  When a gene has a mistake or  mutation  in it, it does not work like it should.     Some people may be candidates for genetic testing of more than one gene.  For these families, genetic testing using a cancer panel may be offered. These panels will test different genes at once known to increase the risk for breast, ovarian, uterine, and/or other cancers.    This handout will review common hereditary breast and gynecologic cancer syndromes. The genes that will be discussed in this handout are: CIARA, BRCA1, BRCA2, BRIP1, CDH1, CHEK2, MLH1, MSH2, MSH6, PMS2, EPCAM, PTEN, PALB2, RAD51C, RAD51D, and TP53.    The purpose of this handout is to serve as a brief summary of the breast and gynecologic cancer risk genes that have published clinical management guidelines for individuals who are found to carry a mutation. Inheriting a mutation does not mean a person will develop cancer, but it does significantly increase their risk above the general population risk.     ______________________________________________________________________________    Hereditary Breast and Ovarian Cancer Syndrome (BRCA1 and BRCA2)  A single mutation in one of the copies of BRCA1 or  BRCA2 increases the risk for breast and ovarian cancer, among others.  The risk for pancreatic cancer and melanoma may also be slightly increased in some families.  The chart below shows the chance that someone with a BRCA mutation would develop cancer in his or her lifetime1,2,3,4.       Lifetime Cancer Risks    General Population BRCA1  BRCA2   Breast  12% >60% >60%   Ovarian  1-2% 39-58% 13-29%   Prostate 12% 7-26% 19-61%   Male Breast 0.1% 0.2-1.2% 1.8-7.1%   Pancreas 1-2% Up to 5% 5-10%     A person s ethnic background is also important to consider, as individuals of Ashkenazi Bahai ancestry have a higher chance of having a BRCA gene mutation.  There are three BRCA mutations that occur more frequently in this population.      Wellington Syndrome (MLH1, MSH2, MSH6, PMS2, and EPCAM)  Currently five genes are known to cause Wellington Syndrome: MLH1, MSH2, MSH6, PMS2, and EPCAM.  A single mutation in one of the Wellington Syndrome genes increases the risk for colon, endometrial, ovarian, and stomach cancers.  Other cancers that occur less commonly in Wellington Syndrome include urinary tract, skin, and brain cancers.  The chart below shows the chance that a person with Wellington syndrome would develop cancer in his or her lifetime5.      Lifetime Cancer Risks    General Population Wellington Syndrome   Colon 5% 10-61%   Endometrial 3% 13-57%   Ovarian 1-2% 1-38%   Stomach <1% 1-9%   *Cancer risk varies depending on Wellington syndrome gene found      Cowden Syndrome (PTEN)  Cowden syndrome is a hereditary condition that increases the risk for breast, thyroid, endometrial, colon, and kidney cancer.  Cowden syndrome is caused by a mutation in the PTEN gene.  A single mutation in one of the copies of PTEN causes Cowden syndrome and increases cancer risk.  The chart below shows the chance that someone with a PTEN mutation would develop cancer in their lifetime6,7.  Other benign features seen in some individuals with Cowden syndrome include benign  skin lesions (facial papules, keratoses, lipomas), learning disability, autism, thyroid nodules, colon polyps, and larger head size.     Lifetime Cancer Risks    General Population Cowden   Breast 12% 40-60%*   Thyroid 1% Up to 38%   Renal 1-2% Up to 35%   Endometrial 3% Up to 28%   Colon 5% Up to 9%   Melanoma 2-3% Up to 6%   *Emerging data suggests the risk for breast cancer could be greater than 60%               Li-Fraumeni Syndrome (TP53)  Li-Fraumeni Syndrome (LFS) is a cancer predisposition syndrome caused by a mutation in the TP53 gene. A single mutation in one of the copies of TP53 increases the risk for multiple cancers. Individuals with LFS are at an increased risk for developing cancer at a young age. The lifetime risk for development of a LFS-associated cancer is 50% by age 30 and 90% by age 60.   Core Cancers: Sarcomas, Breast, Brain, Lung, Leukemias/Lymphomas, Adrenocortical carcinomas  Other Cancers: Gastrointestinal, Thyroid, Skin, Genitourinary       Hereditary Diffuse Gastric Cancer (CDH1)  Currently, one gene is known to cause hereditary diffuse gastric cancer (HDGC): CDH1.  Individuals with HDGC are at increased risk for diffuse gastric cancer and lobular breast cancer. Of people diagnosed with HDGC, 30-50% have a mutation in the CDH1 gene.  This suggests there are likely other genes that may cause HDGC that have not been identified yet.      Lifetime Cancer Risks    General Population HDGC   Diffuse Gastric  <1% ~80%   Breast 12% 41-60%       Additional Genes    CIARA  CIARA is a moderate-risk breast cancer gene. Women who have a mutation in CIARA can have between a 2-4 fold increased risk for breast cancer compared to the general population8. CIARA mutations have also been associated with increased risk for pancreatic cancer between 5-10%9. Individuals who inherit two CIARA mutations have a condition called ataxia-telangiectasia (AT).  This rare autosomal recessive condition affects the nervous system  and immune system, and is associated with progressive cerebellar ataxia beginning in childhood. Individuals with ataxia-telangiectasia often have a weakened immune system and have an increased risk for childhood cancers.    PALB2  Mutations in PALB2 have been shown to increase the risk of breast cancer up to 41-60% in some families; where individuals fall within this risk range is dependent upon family [id number]. PALB2 mutations have also been associated with increased risk for pancreatic cancer between 5-10%.  Individuals who inherit two PALB2 mutations--one from their mother and one from their father--have a condition called Fanconi Anemia.  This rare autosomal recessive condition is associated with short stature, developmental delay, bone marrow failure, and increased risk for childhood cancers.    CHEK2   CHEK2 is a moderate-risk breast cancer gene.  Women who have a mutation in CHEK2 have around a 2-4 fold increased risk for breast cancer compared to the general population, and this risk may be higher depending upon family history.11,12,13 The risk of colon cancer may be twice as high as the general population risk of colon cancer of 5%. Mutations in CHEK2 have also been shown to increase the risk of other cancers, including prostate, however these cancer risks are currently not well understood.    BRIP1, RAD51C and RAD51D  Mutations in RAD51C and RAD51D have been shown to increase the risk of ovarian cancer and breast cancer 14,. Mutations in BRIP1 have been shown to increase the risk of ovarian cancer and possibly female breast cancer 15 .       Lifetime Cancer Risk    General Population        BRIP1   RAD51C  RAD51D   Breast 12% Not well defined 20-40% 20-40%   Ovarian 1-2% 5-15% 10-15% 10-20%     ______________________________________________________________  Inheritance  All of the cancer syndromes reviewed above are inherited in an autosomal dominant pattern.  This means that if a parent has a mutation,  each of their children will have a 50% chance of inheriting that same mutation. Therefore, each child --male or female-- would have a 50% chance of being at increased risk for developing cancer.    Image obtained from Genetics Home Reference, 2013     Mutations in some genes can occur de parminder, which means that a person s mutation occurred for the first time in them and was not inherited from a parent.  Now that they have the mutation, however, it can be passed on to future generations.    Genetic Testing  Genetic testing involves a blood test and will look for any harmful mutations that are associated with increased cancer risk.  If possible, it is recommended that the person(s) who has had cancer be tested before other family members.  That person will give us the most useful information about whether or not a specific gene is associated with the cancer in the family.    Results  There are three possible results of genetic testing:  Positive--a harmful mutation was identified in one or more of the genes  Negative--no mutations were identified in any of the genes tested  Variant of unknown significance--a variation in one of the genes was identified, but it is unclear how this impacts cancer risk in the family    Advantages and Disadvantages   There are advantages and disadvantages to genetic testing.    Advantages  May clarify your cancer risk  Can help you make medical decisions  May explain the cancers in your family  May give useful information to your family members (if you share your results)    Disadvantages  Possible negative emotional impact of learning about inherited cancer risk  Uncertainty in interpreting a negative test result in some situations  Possible genetic discrimination concerns (see below)    Genetic Information Nondiscrimination Act (SUSAN)  The Genetic Information Nondiscrimination Act of 2008 (SUSAN) is a federal law that protects individuals from health insurance or employment discrimination  based on a genetic test result alone (with some exceptions, including employers with fewer than 15 employees, and ).  Although rare, SUSAN  does not cover discrimination protections in terms of life insurance, long term care, or disability insurances.  Visit the National Human [employer] [place] website to learn more.    Reducing Cancer Risk  All of the genes described in this handout have nationally recognized cancer screening guidelines that would be recommended for individuals who test positive.  In addition to increased cancer screening, surgeries may be offered or recommended to reduce cancer risk.  Recommendations are based upon an individual s genetic test result as well as their personal and family history of cancer.    Questions to Think About Regarding Genetic Testing:  What effect will the test result have on me and my relationship with my family members if I have an inherited gene mutation?  If I don t have a gene mutation?  Should I share my test results, and how will my family react to this news, which may also affect them?  Are my children ready to learn new information that may one day affect their own health?    Hereditary Cancer Resources    FORCE: Facing Our Risk of Cancer Empowered facingourrisk.org   Bright Pink bebrightpink.org   Li-Fraumeni Syndrome Association lfsassociation.org   PTEN World PTENworld.com   No stomach for cancer, Inc. nostomachforcancer.org   Stomach cancer relief network Scrnet.org   Collaborative Group of the Americas on Inherited Colorectal Cancer (CGA) cgaicc.com    Cancer Care cancercare.org   American Cancer Society (ACS) cancer.org   National Cancer [place] (NCI) cancer.gov     Please call us if you have any questions or concerns.   Cancer Risk Management Program [phone]-CANCER ([phone])  Ildefonso Kumar, MS [facility]  [phone]  Rama Nava, MS, [facility] [phone]  Nicole Miguel, MS, [facility]  [phone]  Marli Grimaldo, MS, [facility]  [phone]  Shira Flower,  MS, [facility]  [phone]  Marcella Zepeda, MS, [facility] [phone]  Hanna Samuel, MS, [facility] [phone]    References  Brie Webber PDP, Pramod S, Yennifer HYATT, Ajit JE, Vishnu JL, Regla N, Matthew H, Larisa O, Cris A, Pasini B, Radizan P, Mansanford S, Michelle DM, Sandoval N, Ananth E, Maria De Jesus H, Ware E, Reji J, Gronjan J, Rhonda B, Tulinius H, Thorlacius S, Eerola H, Nevanlinna H, Luz Elena K, Chris OP. Average risks of breast and ovarian cancer associated with BRCA1 or BRCA2 mutations detected in case series unselected for family history: a combined analysis of 222 studies. Am J Hum Amanda. 2003;72:1117-30.  Naveed N, Kristel M, Celia G.  BRCA1 and BRCA2 Hereditary Breast and Ovarian Cancer. Gene Reviews online. 2013.  Maged YC, Mery S, Jasmyn G, Vee S. Breast cancer risk among male BRCA1 and BRCA2 mutation carriers. J Natl Cancer Inst. 2007;99:1811-4.  Rickey RICHARDSON, Chandra I, Reed J, Padma E, Mariusz ER, Neno F. Risk of breast cancer in male BRCA2 carriers. J Med Amanda. 2010;47:710-1.  National Comprehensive Cancer Network. Clinical practice guidelines in oncology, colorectal cancer screening. Available online (registration required). 2015.  Swapnil MH, Golden J, Eugene J, Too RUSH, Sharmaine MS, Eng C. Lifetime cancer risks in individuals with germline PTEN mutations. Clin Cancer Res. 2012;18:400-7.  Amrik R. Cowden Syndrome: A Critical Review of the Clinical Literature. J Amanda . 2009:18:13-27.  Mallory GARCIA, Fredy CARDOZA, Basim S, Coleen P, Sonia T, Sonny M, Juan B, Cecil H, Raymond R, Trinidad K, Shereen L, Rickey RICHARDSON, Michelle CARDOZA, Ming DF, Aly MR, The Breast Cancer Susceptibility Collaboration ([place]) & Sal NEVES. CIARA mutations that cause ataxia-telangiectasia are breast cancer susceptibility alleles. Nature Genetics. 2006;38:873-875  Nadir N , Giuliana Y, Denisse J, Jayden L, Teo SELLERS , Sandi ML, Anu S, Amy AG, Taylor S, Yvette ML, Janki J , Ankur R, Nigel DYE, Kamlesh  JR, Leidy VE, Gavin M, Vomercedesstein B, Adán N, Yasmin RH, Gavin KW, and Hannah AP. CIARA mutations in patients with hereditary pancreatic cancer. Cancer Discover. 2012;2:41-46  Jaciel MARTI., et al. Breast-Cancer Risk in Families with Mutations in PALB2. NEJM. 2014; 371(6):497-506.  CHEK2 Breast Cancer Case-Control Consortium. CHEK2*1100delC and susceptibility to breast cancer: A collaborative analysis involving 10,860 breast cancer cases and 9,065 controls from 10 studies. Am J Hum Amanda, 74 (2004), pp. 8056-3061  Lizzie T, Mary S, Charlie K, et al. Spectrum of Mutations in BRCA1, BRCA2, CHEK2, and TP53 in Families at High Risk of Breast Cancer. JAMES. 2006;295(12):3643-5329.   Leonie C, Jaleel D, Ailene GARCIA, et al. Risk of breast cancer in women with a CHEK2 mutation with and without a family history of breast cancer. J Clin Oncol. 2011;29:1048-0860.  Song H, Zhangs E, Ramus SJ, et al. Contribution of germline mutations in the RAD51B, RAD51C, and RAD51D genes to ovarian cancer in the population. J Clin Oncol. 2015;33(26):2259-0864. Doi:10.1200/JCO.2015.61.2408.  Panchito T, Luciano DF, Kirsty P, et al. Mutations in BRIP1 confer high risk of ovarian cancer. Eileen Amanda. 2011;43(11):3627-4235. doi:10.1038/ng.955.

## 2023-08-24 NOTE — LETTER
Cancer Risk Management  Program Locations    [facility]  [facility]  [facility]  Mailing Address  Cancer Risk Management Program  [facility]  [address]  [place] [address]    New patient appointments  [phone]    August 24, 2023    Nava Goldman  [address] [address]    Dear Nava,    It was a pleasure talking with you via your virtual genetic counseling visit on 8/24/2023.  Below is a copy of the progress note from that recent visit through the Cancer Risk Management Program.  If you have any additional questions, please feel free to contact me.    Referring Provider: Arina Brewster DO     Presenting Information:   I met with Nava Goldman, along with her  Juice,  today for her video genetic counseling visit through the Cancer Risk Management Program to discuss her personal history of breast cancer and family history of breast cancer. She is here today to review this history, cancer screening recommendations, and available genetic testing options.    Personal History:  Nava is a 58 year old female. She was recently diagnosed with breast cancer. On 4/5/2023, a screening mammogram found focal asymmetry in the left breast. A diagnostic mammogram and ultrasound on 4/19/2023 showed a spiculated mass. On 4/24/2023, a biopsy revealed invasive ductal carcinoma (ER/KS +, HER2 -) with associated DCIS. Nava underwent a bilateral mastectomy on 6/22/2023.     Nava already had some genetic testing ordered by Dr. Brewster via the Breast Actionable Panel offered by the Molecular Diagnostics Laboratory at [facility]. Nava is negative for mutations in the CIARA, BARD1, BRCA1, BRCA2, CDH1, CHEK2, NF1, PALB2, PTEN, STK11, and TP53 genes by sequencing and deletion/duplication analysis. No mutations  were found in any of the 11 genes analyzed. We reviewed her negative results in detail today.     In her hormonal-based medical history, she had her first menstrual period at age 12 or 13, her first child at age 25, and underwent menopause at age 51. Nava has her ovaries, fallopian tubes and uterus in place, and reports no ovarian cancer screening to date. She reports no history of hormone replacement therapy. Nava reports oral contraceptive use for a couple months. She began having colonoscopies at the age of 49. Her most recent colonoscopy in March 2015 was normal and follow-up was recommended in 5 years. Nava denies any history of dermatological exams. She does not regularly do any other cancer screening at this time. Nava reported no history smoking and no current alcohol use.    Family History: (Please see scanned pedigree for detailed family history information)  Nava's sister was diagnosed with breast cancer in her 30's/40's. It is unknown if her sister had any genetic testing. She is currently 56.   Maternal half aunt (Nava's mother's paternal half sister) was diagnosed with breast cancer in her 70's. She is currently in her late 70's.   Of note, there is no reported history of cancer on her paternal side of her family.   Her maternal and paternal ethnicity is Chinese. There is no known Ashkenazi Islam ancestry on either side of her family. There is no reported consanguinity.    Discussion:  Nava's personal history of breast cancer and family history of breast cancer is suggestive of a hereditary cancer syndrome.  We reviewed the features of sporadic, familial, and hereditary cancers.   The vast majority of cancers are considered sporadic and not primarily due to an inherited factor. Individuals can develop cancer due to aging, chance events, environmental exposures or lifestyles.  Features typically seen in high risk families include: cancers diagnosed under age 50, multiple relatives  with similar cancers on the same side of the family, cancers in multiple generations, and relatives with certain rare cancers (i.e., male breast cancer).   We discussed the natural history and genetics of several hereditary cancer syndromes, including Hereditary Breast and Ovarian Cancer Syndrome (HBOC).   The most common cause of hereditary breast and ovarian cancer is HBOC, which is caused by mutations in the BRCA1 and BRCA2 genes. Individuals with HBOC syndrome are at increased risk for several different cancers, including breast, ovarian, male breast, prostate, melanoma, and pancreatic cancer. HBOC typically presents with multiple family members diagnosed with breast cancer before age 50 and/or ovarian cancer.   A detailed handout regarding information about HBOC and related hereditary cancer syndromes will be provided to Nava via [employer] and can be found in the after visit summary. Topics included: inheritance pattern, cancer risks, cancer screening recommendations, and also risks, benefits and limitations of testing.  In looking at Nava's personal and family history, it is possible that a cancer susceptibility gene is present due to her personal history of breast cancer at age 58 and her sister diagnosed with breast cancer in her 30's/40's.  Based on her personal and family history, Nava meets current National Comprehensive Cancer Network (NCCN) criteria for genetic testing of high-penetrance breast cancer susceptibility genes (including BRCA1, BRCA2, CDH1, PALB2, PTEN, and TP53).   We reviewed Nava's negative genetic testing results for the Breast Actionable panel ordered through [facility].   Nava was negative for mutations in the CIARA, BARD1, BRCA1, BRCA2, CDH1, CHEK2, NF1, PALB2, PTEN, STK11, and TP53 genes by sequencing and deletion/duplication analysis. No mutations were found in any of the 11 genes analyzed.   We discussed that Nava did not pass  on an identifiable mutation in these genes to her children based on this test result.  Mutations in these genes do not skip generations.    Nava's previous testing was a more limited breast cancer panel. There are several other breast cancer susceptibility genes that weren't included in her testing. It is possible that a mutation could be identified in Nava in one of these other breast cancer associated genes. Therefore, it would be appropriate for Nava to consider more comprehensive genetic testing related to genes associated with breast cancer to better understand her risk for hereditary cancer.  We reviewed genetic testing options for Nava based on her personal and family history: a panel of genes associated with an increased risk for hereditary breast and gynecologic cancers, or larger panel options to include genes associated with increased risk for multiple different cancer types. Nava expressed interest in the GYN Expanded panel through [facility].   Genetic testing is available for 25 genes associated with increased risk for breast and gynecological cancers: Gyn Expanded Panel: (CIARA, BARD1, BRCA1, BRCA2, BRIP1, CDH1, CHEK2, DICER1, EPCAM, MLH1, MRE11, MSH2, MSH6, MUTYH, NBN, NF1, PALB2, PMS2, PTEN, RAD50, RAD51C, RAD51D, SMARCA4, STK11 and TP53)  We discussed that some of the genes in the Gyn Expanded Panel are associated with specific hereditary cancer syndromes and published management guidelines: Hereditary Breast and Ovarian Cancer syndrome (BRCA1, BRCA2), Wellington syndrome (MLH1, MSH2, MSH6, PMS2, EPCAM), Peutz-Jeghers syndrome (STK11), Hereditary Diffuse Gastric Cancer (CDH1), Cowden syndrome (PTEN), Li Fraumeni syndrome (TP53), MUTYH Associated Polyposis (MUTYH), and Neurofibromatosis type 1 (NF1).   Risk-reducing salpingo-oophorectomy can be considered in women with mutations in BRIP1, RAD51C, or RAD51D.  Breast and/or other cancer risk management  guidelines are available for CIARA, CHEK2, PALB2, NF1, and NBN.  The remaining genes (BARD1, DICER1, RAD50, and SMARCA4) are associated with increased cancer risk and may allow us to make medical recommendations when mutations are identified.  Nava's blood has already been drawn for testing and is currently at the lab.  Verbal consent was given over video and written on the consent form. Turnaround time is approximately 3-4 weeks.  Medical Management: For Nava, we reviewed that the information from genetic testing may determine:  additional cancer screening for which Nava may qualify (i.e. more frequent colonoscopies, more frequent dermatologic exams, etc.),  options for risk reducing surgeries Nava could consider (i.e. surgery to remove her ovaries and/or uterus, etc.),    and targeted chemotherapies if she were to develop certain cancers in the future (i.e. immunotherapy for individuals with Wellington syndrome, PARP inhibitors, etc.).   These recommendations and possible targeted chemotherapies will be discussed in detail once genetic testing is completed.       Plan:  1) Today Nava elected to proceed with additional genetic testing via the GYN Expanded panel through [facility]. Therefore, consent was reviewed and verbally obtained for this testing.  2) Her blood has already been drawn and is currently at the lab.    3) The results should be available in 3-4 weeks.  4) Nava will either have a telephone visit or video visit to discuss the results.  Additional recommendations about screening will be made at that time.    Nava is 58 year old and is being evaluated via a billable video visit.    Time spent on video: 33 minutes    Rama Nava MS, [facility]  Licensed, Certified Genetic Counselor  Phone: [phone]

## 2023-08-24 NOTE — NURSING NOTE
Is the patient currently in the state of [place]? YES    Visit mode:VIDEO    If the visit is dropped, the patient can be reconnected by: VIDEO VISIT: Text to cell phone:   Telephone Information:   Mobile [phone]       Will anyone else be joining the visit? YES,  Juice  (If patient encounters technical issues they should call [phone])    How would you like to obtain your AVS? MyChart    Are changes needed to the allergy or medication list? No    Reason for visit: Consult    PAT BRADFORD

## 2023-08-25 ENCOUNTER — OFFICE VISIT (OUTPATIENT)
Dept: PLASTIC SURGERY | Facility: AMBULATORY SURGERY CENTER | Age: 58
End: 2023-08-25
Payer: COMMERCIAL

## 2023-08-25 DIAGNOSIS — Z98.890 STATUS POST BREAST RECONSTRUCTION: Primary | ICD-10-CM

## 2023-08-25 PROCEDURE — 99024 POSTOP FOLLOW-UP VISIT: CPT | Performed by: PLASTIC SURGERY

## 2023-08-25 NOTE — PROGRESS NOTES
Cherelle is a 57 years old lady status post direct to implant breast reconstruction in the subpectoral plane with 325 full profile implants.  She is 2-month out from surgery.  Patient is doing excellent.    At the physical exam, patient presents with excellent shape and symmetry bilaterally.  Bilateral inframammary fold incisions are well-healed.  There is no evidence of hypertrophic scarring or keloid.  There is no evidence of capsular contraction (Baker 1).        Plan: Continue with scar massage, may begin massage on the implants, resume all normal activities and wear normal bra.  Follow-up with me in 3-month.  Patient is doing excellent from plastic surgery standpoint.  She is happy with results and so am I.    Charles Medina MD , FACS   Diplomate American Board of Plastic Surgery  Diplomate American Board of Surgery  Adj. Assistant Professor of Surgery  Division of Plastic & Reconstructive Surgery   [facility]  Office: [phone]   8/25/2023 at 9:13 AM

## 2023-08-25 NOTE — NURSING NOTE
Patient here today for S/P bilateral mastectomy with breast recon on 06/22/2023  Combo case with [place].     Patient is doing well overall. Recently returned from travel.     Hilary Fuchs RN on 8/25/2023 at 8:44 AM

## 2023-08-25 NOTE — LETTER
8/25/2023         RE: Nava Goldman  [address] [address]        Dear Colleague,    Thank you for referring your patient, Nava Goldman, to the [facility] [place]. Please see a copy of my visit note below.    Cherelle is a 57 years old lady status post direct to implant breast reconstruction in the subpectoral plane with 325 full profile implants.  She is 2-month out from surgery.  Patient is doing excellent.    At the physical exam, patient presents with excellent shape and symmetry bilaterally.  Bilateral inframammary fold incisions are well-healed.  There is no evidence of hypertrophic scarring or keloid.  There is no evidence of capsular contraction (Baker 1).        Plan: Continue with scar massage, may begin massage on the implants, resume all normal activities and wear normal bra.  Follow-up with me in 3-month.  Patient is doing excellent from plastic surgery standpoint.  She is happy with results and so am I.    Charles Medina MD , FACS   Diplomate American Board of Plastic Surgery  Diplomate American Board of Surgery  Adj. Assistant Professor of Surgery  Division of Plastic & Reconstructive Surgery   [facility]  Office: [phone]   8/25/2023 at 9:13 AM       Again, thank you for allowing me to participate in the care of your patient.        Sincerely,        Charles Medina MD

## 2023-08-28 DIAGNOSIS — Z80.3 FAMILY HISTORY OF MALIGNANT NEOPLASM OF BREAST: ICD-10-CM

## 2023-08-28 DIAGNOSIS — C50.912 INVASIVE DUCTAL CARCINOMA OF BREAST, LEFT (H): ICD-10-CM

## 2023-08-28 PROCEDURE — G0452 MOLECULAR PATHOLOGY INTERPR: HCPCS | Mod: 26 | Performed by: STUDENT IN AN ORGANIZED HEALTH CARE EDUCATION/TRAINING PROGRAM

## 2023-09-04 LAB
PATH REPORT.COMMENTS IMP SPEC: ABNORMAL
PATH REPORT.COMMENTS IMP SPEC: ABNORMAL
PATH REPORT.COMMENTS IMP SPEC: YES
PATH REPORT.FINAL DX SPEC: ABNORMAL
PATH REPORT.GROSS SPEC: ABNORMAL
PATH REPORT.MICROSCOPIC SPEC OTHER STN: ABNORMAL
PATH REPORT.RELEVANT HX SPEC: ABNORMAL
PATH REPORT.RELEVANT HX SPEC: ABNORMAL
PATH REPORT.SITE OF ORIGIN SPEC: ABNORMAL

## 2023-09-14 ENCOUNTER — PATIENT OUTREACH (OUTPATIENT)
Dept: ONCOLOGY | Facility: CLINIC | Age: 58
End: 2023-09-14
Payer: COMMERCIAL

## 2023-09-14 NOTE — PROGRESS NOTES
[facility]: Cancer Care Short Note                                    Discussion with Patient:                                                      Reminder to call patient to see how tolerating anastrozole.      Assessment:                                                      No assessment indicated  Didn't talk with patient.     Intervention/Education provided during outreach:                                                       Called patient left message to return my call.  When patient calls back, remind her her DXA scan showed low bone density and make sure she is taking calcium and Vit D and doing weight bearing exercises     9/15/23:  Called patient. Left message (no patient identifiers left) and instructed patient to call back if has any symptoms, concerns, questions with her new medication. Call back number left:  [phone].    Patient left message returning call. She said she is not feeling and side effects of medication.     Patient to follow up as scheduled at next appt    Signature:  Glenis Metz RN

## 2023-09-19 NOTE — PROGRESS NOTES
"9/22/2023    Virtual Visit Details  Type of service:  Video Visit   Originating Location (pt. Location): Home  Distant Location (provider location):  Off-site  Platform used for Video Visit: Cleve    Referring Provider: Arina Brewster DO     Presenting Information:  I spoke to Nava today to discuss her genetic testing results. Her blood was drawn on 5/8/2023. The GYN Expanded panel was ordered from [facility]'s [facility]ostics [facility]. This testing was done because of Nava's personal history of breast cancer and family history of breast cancer.    Genetic Testing Result: NEGATIVE  Nava tested negative for mutations in the CIARA, BARD1, BRCA1, BRCA2, BRIP1, CDH1, CHEK2, DICER1, EPCAM, MLH1, MRE11, MSH2, MSH6, MUTYH, NBN, NF1, PALB2, PMS2, PTEN, RAD50, RAD51C, RAD51D, SMARCA4, STK11 and TP53 gene. No mutations were found in any of the 25 genes analyzed. This test involved sequencing and deletion/duplication analysis of all genes with the exceptions of EPCAM (deletions/duplications only). Please see a copy of the scanned test report for complete details regarding testing methodology/limitations.    A copy of the test report can be found in the Laboratory tab, dated 8/28/2023, and named \"Next generation sequencing\".     Interpretation:  We discussed several different interpretations of this negative test result.    One explanation may be that there is a different gene or combination of genes and environment that are associated with the cancers in this family.  It is possible that her relatives diagnosed with cancer did have a mutation and she did not inherit it.  There is also a small possibility that there is a mutation in one of these genes, and the testing laboratory could not find it with their current testing methods.       Screening:  Based on this negative test result, it is important for Nava and her relatives to refer back to the family history for appropriate cancer screening.  "   Nava should continue to follow up with her oncology team as recommended about her breast cancer surveillance.   Nava's close female relatives remain at increased risk for breast cancer given their family history. Breast cancer screening is generally recommended to begin approximately 10 years younger than the earliest age of breast cancer diagnosis in the family, or at age 40, whichever comes first. In this family, screening may begin 10 years younger than her sister's age of diagnosis. Breast screening options should be discussed with an individual's primary care provider and a genetic counselor, to determine at what age to begin screening, what screening is appropriate, and if additional screening (such as breast MRI) is necessary based on personal/family history factors.   Other population cancer screening options, such as those recommended by the American Cancer Society and the National Comprehensive Cancer Network (NCCN), are also appropriate for Nava and her family. These screening recommendations may change if there are changes to Nava's personal and/or family history of cancer.   Final screening recommendations should be made by each individual's primary care provider.    Inheritance:  We reviewed autosomal dominant inheritance. We discussed that Nava did not pass on an identifiable mutation in these genes to her children based on this test result.  Mutations in these genes do not skip generations.      Additional Testing Considerations:  Although Nava's genetic testing result was negative, other relatives may still carry a gene mutation associated with breast cancer. Genetic counseling is recommended for Nava's sister to discuss genetic testing options. If any of these relatives do pursue genetic testing, Nava is encouraged to contact me so that we may review the impact of their test results on her.    Summary:  We do not have an explanation for Nava's personal and family  history of cancer. While no genetic changes were identified, Nava may still be at risk for certain cancers due to family history, environmental factors, or other genetic causes not identified by this test.  Because of that, it is important that she continue with cancer screening based on her personal and family history as discussed above.    Genetic testing is rapidly advancing, and new cancer susceptibility genes will most likely be identified in the future.  Therefore, I encouraged Nava to contact me regularly or if there are changes in her personal or family history.  This may change how we assess her cancer risk, screening, and the testing we would offer.    Plan:  1. A copy of the test results will be mailed to Nava.  2. She plans to follow-up with her other providers.  3. She should contact me regularly, or sooner if her family history changes.    If Nava has any further questions, I encouraged her to contact me at [phone].     Time spent on video: 3 minutes.    Rama Nava MS, [facility]  Licensed, Certified Genetic Counselor  Phone: [phone]

## 2023-09-22 ENCOUNTER — VIRTUAL VISIT (OUTPATIENT)
Dept: ONCOLOGY | Facility: CLINIC | Age: 58
End: 2023-09-22
Payer: COMMERCIAL

## 2023-09-22 DIAGNOSIS — Z80.3 FAMILY HISTORY OF MALIGNANT NEOPLASM OF BREAST: ICD-10-CM

## 2023-09-22 DIAGNOSIS — C50.912 INVASIVE DUCTAL CARCINOMA OF BREAST, LEFT (H): ICD-10-CM

## 2023-09-22 PROCEDURE — 999N000069 HC STATISTIC GENETIC COUNSELING, < 16 MIN: Mod: GT,95

## 2023-09-22 NOTE — LETTER
"Cancer Risk Management  Program Locations    [facility]  [facility]  [facility]  Mailing Address  Cancer Risk Management Program  [facility]  [address]  [place] [address]    New patient appointments  [phone]    September 22, 2023    Nava Goldman  [address] [place] [address]    Dear Nava,    It was a pleasure talking with you via your virtual genetic counseling visit on 9/22/2023.  Below is a copy of the progress note from that recent visit through the Cancer Risk Management Program.  If you have any additional questions, please feel free to contact me.    Referring Provider: Arina Brewster DO     Presenting Information:  I spoke to Nava today to discuss her genetic testing results. Her blood was drawn on 5/8/2023. The GYN Expanded panel was ordered from [facility]'s [facility]. This testing was done because of Nava's personal history of breast cancer and family history of breast cancer.    Genetic Testing Result: NEGATIVE  Nava tested negative for mutations in the CIARA, BARD1, BRCA1, BRCA2, BRIP1, CDH1, CHEK2, DICER1, EPCAM, MLH1, MRE11, MSH2, MSH6, MUTYH, NBN, NF1, PALB2, PMS2, PTEN, RAD50, RAD51C, RAD51D, SMARCA4, STK11 and TP53 gene. No mutations were found in any of the 25 genes analyzed. This test involved sequencing and deletion/duplication analysis of all genes with the exceptions of EPCAM (deletions/duplications only). Please see a copy of the scanned test report for complete details regarding testing methodology/limitations.    A copy of the test report can be found in the Laboratory tab, dated 8/28/2023, and named \"Next generation sequencing\".     Interpretation:  We discussed several different interpretations of this negative test result.    One explanation " may be that there is a different gene or combination of genes and environment that are associated with the cancers in this family.  It is possible that her relatives diagnosed with cancer did have a mutation and she did not inherit it.  There is also a small possibility that there is a mutation in one of these genes, and the testing laboratory could not find it with their current testing methods.       Screening:  Based on this negative test result, it is important for Nava and her relatives to refer back to the family history for appropriate cancer screening.    Nava should continue to follow up with her oncology team as recommended about her breast cancer surveillance.   Nava's close female relatives remain at increased risk for breast cancer given their family history. Breast cancer screening is generally recommended to begin approximately 10 years younger than the earliest age of breast cancer diagnosis in the family, or at age 40, whichever comes first. In this family, screening may begin 10 years younger than her sister's age of diagnosis. Breast screening options should be discussed with an individual's primary care provider and a genetic counselor, to determine at what age to begin screening, what screening is appropriate, and if additional screening (such as breast MRI) is necessary based on personal/family history factors.   Other population cancer screening options, such as those recommended by the American Cancer Society and the National Comprehensive Cancer Network (NCCN), are also appropriate for Nava and her family. These screening recommendations may change if there are changes to Nava's personal and/or family history of cancer.   Final screening recommendations should be made by each individual's primary care provider.    Inheritance:  We reviewed autosomal dominant inheritance. We discussed that Nava did not pass on an identifiable mutation in these genes to her children based on  this test result.  Mutations in these genes do not skip generations.      Additional Testing Considerations:  Although Nava's genetic testing result was negative, other relatives may still carry a gene mutation associated with breast cancer. Genetic counseling is recommended for Nava's sister to discuss genetic testing options. If any of these relatives do pursue genetic testing, Nava is encouraged to contact me so that we may review the impact of their test results on her.    Summary:  We do not have an explanation for Nava's personal and family history of cancer. While no genetic changes were identified, Nava may still be at risk for certain cancers due to family history, environmental factors, or other genetic causes not identified by this test.  Because of that, it is important that she continue with cancer screening based on her personal and family history as discussed above.    Genetic testing is rapidly advancing, and new cancer susceptibility genes will most likely be identified in the future.  Therefore, I encouraged Nava to contact me regularly or if there are changes in her personal or family history.  This may change how we assess her cancer risk, screening, and the testing we would offer.    Plan:  1. A copy of the test results will be mailed to Nava.  2. She plans to follow-up with her other providers.  3. She should contact me regularly, or sooner if her family history changes.    If Nava has any further questions, I encouraged her to contact me at [phone].     Time spent on video: 3 minutes.    Rama Nava MS, [facility]  Licensed, Certified Genetic Counselor  Phone: [phone]

## 2023-09-22 NOTE — PATIENT INSTRUCTIONS
Negative Genetic Test Result    Genetic Testing  Genetic testing involved a blood or saliva test which looked at the genetic information in select genes for variants associated with cancer risk.  This testing may have included analysis of a single gene due to a known variant in the family, multiple genes most associated with the cancers in a family, or an expanded panel of genes related to many types of cancers.     Results  There are several possible genetic test results, including:   Positive--a harmful mutation (also known as a  pathogenic  or  likely pathogenic  variant) was identified in a gene associated with increased cancer risk.  These risks, as well as medical management options, depend on the specific genetic variant identified.    Negative--no variants were identified in the genes analyzed   Variant of unknown significance--a variant was identified in one or more genes, though it is currently unclear how this impacts cancer risk in the family.  Genetic testing labs are working to collect evidence about these uncertain variants and may provide updates in the future.    What Does a Negative Genetic Test Result Mean?  A negative genetic test results means that no genetic changes (variants) were detected in the genes tested. While no inherited risk factors for cancer were identified, you and your family may be at risk for certain cancers due to family history, environmental factors, or other genetic causes not identified by this test.  It is also possible that your family may still be at risk of carrying a genetic risk factor which you did not inherit. Your genetic counselor can help interpret the result for you and your relatives.      It is important to note which genes were included in your test. A list of these genes can be found on your test result.    Screening Recommendations  A combination of personal and family history factors may inform cancer risk and medical management recommendations.   Population cancer screening options, such as those recommended by the American Cancer Society and the National Comprehensive Cancer Network (NCCN) are appropriate for many families at average risk for cancer.  However, earlier and/or more frequent screening may be recommended based on personal factors (lifestyle, exposures, medications, screening results), family history of cancer, and sometimes genetic factors.  These cancer risk management options should be discussed in more detail with an individual's medical providers.      Please call us if you have any questions or concerns.   Cancer Risk Management Program (Appointments: [phone])  Ildefonso Kumar, MS [facility]  [phone]  Rama Nava, MS, [facility] [phone]  Nicole Miguel, MS, [facility]  [phone]  Marli Grimaldo, MS, [facility]  [phone]  Shira Flower, MS, [facility]  [phone]  Marcella Zepeda, MS, [facility] [phone]  Hanna Samuel, MS, [facility] [phone]

## 2023-09-22 NOTE — NURSING NOTE
Is the patient currently in the state of [place]? YES    Visit mode:VIDEO    If the visit is dropped, the patient can be reconnected by: VIDEO VISIT: Text to cell phone:   Telephone Information:   Mobile [phone]       Will anyone else be joining the visit? Yes,  Juice  (If patient encounters technical issues they should call [phone] :[id number])    How would you like to obtain your AVS? MyChart    Are changes needed to the allergy or medication list? No    Reason for visit: GUIDO BRADFORD

## 2023-09-22 NOTE — Clinical Note
"    9/22/2023         RE: Nava Goldman  [address] [address]        Dear Colleague,    Thank you for referring your patient, Nava Goldman, to the [facility]. Please see a copy of my visit note below.    9/22/2023    Virtual Visit Details    Type of service:  Video Visit     Originating Location (pt. Location): {video visit patient location:560939::\"Home\"}  {PROVIDER LOCATION On-site should be selected for visits conducted from your clinic location or adjoining [facility] hospital, academic office, or other nearby [facility] building. Off-site should be selected for all other provider locations, including home:[id number]}  Distant Location (provider location):  {virtual location provider:570609}  Platform used for Video Visit: {Virtual Visit Platforms:240678::\"[employer]\"}    Referring Provider: Arina Brewster DO     Presenting Information:  I spoke to Nava today to discuss her genetic testing results. Her blood was drawn on 5/8/2023. The GYN Expanded panel was ordered from [facility]'s [facility]. This testing was done because of Nava's personal history of breast cancer and family history of breast cancer.    Genetic Testing Result: NEGATIVE  Nava tested negative for mutations in the CIARA, BARD1, BRCA1, BRCA2, BRIP1, CDH1, CHEK2, DICER1, EPCAM, MLH1, MRE11, MSH2, MSH6, MUTYH, NBN, NF1, PALB2, PMS2, PTEN, RAD50, RAD51C, RAD51D, SMARCA4, STK11 and TP53 gene. No mutations were found in any of the 25 genes analyzed. This test involved sequencing and deletion/duplication analysis of all genes with the exceptions of EPCAM (deletions/duplications only). Please see a copy of the scanned test report for complete details regarding testing methodology/limitations.    A copy of the test report can be found in the Laboratory tab, dated 8/28/2023, and named \"Next generation sequencing\".     Interpretation:  We discussed several different interpretations " of this negative test result.    One explanation may be that there is a different gene or combination of genes and environment that are associated with the cancers in this family.  It is possible that her relatives diagnosed with cancer did have a mutation and she did not inherit it.  There is also a small possibility that there is a mutation in one of these genes, and the testing laboratory could not find it with their current testing methods.       Screening:  Based on this negative test result, it is important for Nava and her relatives to refer back to the family history for appropriate cancer screening.    Nava should continue to follow up with her oncology team as recommended about her breast cancer surveillance.   Nava's close female relatives remain at increased risk for breast cancer given their family history. Breast cancer screening is generally recommended to begin approximately 10 years younger than the earliest age of breast cancer diagnosis in the family, or at age 40, whichever comes first. In this family, screening may begin 10 years younger than her sister's age of diagnosis. Breast screening options should be discussed with an individual's primary care provider and a genetic counselor, to determine at what age to begin screening, what screening is appropriate, and if additional screening (such as breast MRI) is necessary based on personal/family history factors.   Other population cancer screening options, such as those recommended by the American Cancer Society and the National Comprehensive Cancer Network (NCCN), are also appropriate for Nava and her family. These screening recommendations may change if there are changes to Nava's personal and/or family history of cancer.   Final screening recommendations should be made by each individual's primary care provider.    Inheritance:  We reviewed autosomal dominant inheritance. We discussed that Nava did not pass on an identifiable  mutation in these genes to her children based on this test result.  Mutations in these genes do not skip generations.      Additional Testing Considerations:  Although Nava's genetic testing result was negative, other relatives may still carry a gene mutation associated with breast cancer. Genetic counseling is recommended for Nava's sister to discuss genetic testing options. If any of these relatives do pursue genetic testing, Nava is encouraged to contact me so that we may review the impact of their test results on her.    Summary:  We do not have an explanation for Nava's personal and family history of cancer. While no genetic changes were identified, Nava may still be at risk for certain cancers due to family history, environmental factors, or other genetic causes not identified by this test.  Because of that, it is important that she continue with cancer screening based on her personal and family history as discussed above.    Genetic testing is rapidly advancing, and new cancer susceptibility genes will most likely be identified in the future.  Therefore, I encouraged Nava to contact me regularly or if there are changes in her personal or family history.  This may change how we assess her cancer risk, screening, and the testing we would offer.    Plan:  1. A copy of the test results will be mailed to Nava.  2. She plans to follow-up with her other providers.  3. She should contact me regularly, or sooner if her family history changes.    If Nava has any further questions, I encouraged her to contact me at [phone].     Time spent on video: *** minutes.    Rama Nava MS, [facility]  Licensed, Certified Genetic Counselor  Phone: [phone]      Again, thank you for allowing me to participate in the care of your patient.        Sincerely,        Rama Nava GC

## 2023-10-17 DIAGNOSIS — C50.912 INVASIVE DUCTAL CARCINOMA OF BREAST, FEMALE, LEFT (H): ICD-10-CM

## 2023-10-18 RX ORDER — ANASTROZOLE 1 MG/1
1 TABLET ORAL DAILY
Qty: 90 TABLET | Refills: 0 | Status: SHIPPED | OUTPATIENT
Start: 2023-10-18 | End: 2024-01-17

## 2023-10-18 NOTE — TELEPHONE ENCOUNTER
Last Written Prescription Date:  8/17/23  Last Fill Quantity: 30,  # refills: 1   Last office visit provider:  8/17/23 with Dr. Waterman, follow up scheduled 11/17/23    Requested Prescriptions   Pending Prescriptions Disp Refills    anastrozole (ARIMIDEX) 1 MG tablet [Pharmacy Med Name: Anastrozole 1 MG Oral Tablet] 30 tablet 0     Sig: Take 1 tablet by mouth once daily       There is no refill protocol information for this order          Jany Calderón RN 10/18/23 8:26 AM

## 2023-11-14 ENCOUNTER — OFFICE VISIT (OUTPATIENT)
Dept: PLASTIC SURGERY | Facility: AMBULATORY SURGERY CENTER | Age: 58
End: 2023-11-14
Payer: COMMERCIAL

## 2023-11-14 DIAGNOSIS — Z98.890 STATUS POST BILATERAL BREAST RECONSTRUCTION: Primary | ICD-10-CM

## 2023-11-14 PROCEDURE — 99212 OFFICE O/P EST SF 10 MIN: CPT | Performed by: PLASTIC SURGERY

## 2023-11-14 NOTE — PROGRESS NOTES
Mrs. Goldman is a 58 years old lady status post bilateral breast reconstruction, direct to implant, 325 cc full profile implant in the subpectoral plane.  She is almost 5 months out from surgery.  She is feeling well.    At the physical exam, all incisions are well-healed, no sign of hypertrophic scarring or keloids.  No implant malposition.  Good shape and symmetry bilaterally.    Plan: Continue to apply cocoa butter lotion along the scars, continue with scar massage and breast implant massage.  Follow-up with me in 1 year    Patient is doing excellent from plastic surgery standpoint.  She is happy with her results and so am I.    Charles Medina MD , FACS   Diplomate American Board of Plastic Surgery  Diplomate American Board of Surgery  Adj. Assistant Professor of Surgery  Division of Plastic & Reconstructive Surgery   [facility]  Office: [phone]   11/14/2023 at 3:37 PM

## 2023-11-14 NOTE — LETTER
11/14/2023         RE: Nava Goldman  [address] [address]        Dear Colleague,    Thank you for referring your patient, Nava Goldman, to the [facility] [place]. Please see a copy of my visit note below.    Mrs. Goldman is a 58 years old lady status post bilateral breast reconstruction, direct to implant, 325 cc full profile implant in the subpectoral plane.  She is almost 5 months out from surgery.  She is feeling well.    At the physical exam, all incisions are well-healed, no sign of hypertrophic scarring or keloids.  No implant malposition.  Good shape and symmetry bilaterally.    Plan: Continue to apply cocoa butter lotion along the scars, continue with scar massage and breast implant massage.  Follow-up with me in 1 year    Patient is doing excellent from plastic surgery standpoint.  She is happy with her results and so am I.    Charles Medina MD , FACS   Diplomate American Board of Plastic Surgery  Diplomate American Board of Surgery  Adj. Assistant Professor of Surgery  Division of Plastic & Reconstructive Surgery   [facility]  Office: [phone]   11/14/2023 at 3:37 PM                 Again, thank you for allowing me to participate in the care of your patient.        Sincerely,        Charles Medina MD

## 2023-11-22 ENCOUNTER — TELEPHONE (OUTPATIENT)
Dept: ONCOLOGY | Facility: CLINIC | Age: 58
End: 2023-11-22
Payer: COMMERCIAL

## 2024-01-17 ENCOUNTER — ONCOLOGY VISIT (OUTPATIENT)
Dept: ONCOLOGY | Facility: HOSPITAL | Age: 59
End: 2024-01-17
Attending: INTERNAL MEDICINE
Payer: COMMERCIAL

## 2024-01-17 VITALS
BODY MASS INDEX: 22.31 KG/M2 | HEART RATE: 83 BPM | SYSTOLIC BLOOD PRESSURE: 114 MMHG | OXYGEN SATURATION: 97 % | DIASTOLIC BLOOD PRESSURE: 75 MMHG | RESPIRATION RATE: 16 BRPM | WEIGHT: 118.2 LBS | HEIGHT: 61 IN | TEMPERATURE: 98.2 F

## 2024-01-17 DIAGNOSIS — C50.912 INVASIVE DUCTAL CARCINOMA OF BREAST, FEMALE, LEFT (H): ICD-10-CM

## 2024-01-17 DIAGNOSIS — M85.9 LOW BONE DENSITY: ICD-10-CM

## 2024-01-17 DIAGNOSIS — Z79.811 LONG TERM CURRENT USE OF AROMATASE INHIBITOR: Primary | ICD-10-CM

## 2024-01-17 PROCEDURE — 99214 OFFICE O/P EST MOD 30 MIN: CPT | Performed by: INTERNAL MEDICINE

## 2024-01-17 PROCEDURE — G0463 HOSPITAL OUTPT CLINIC VISIT: HCPCS | Performed by: INTERNAL MEDICINE

## 2024-01-17 RX ORDER — ANASTROZOLE 1 MG/1
1 TABLET ORAL DAILY
Qty: 90 TABLET | Refills: 1 | Status: SHIPPED | OUTPATIENT
Start: 2024-01-17 | End: 2024-09-30

## 2024-01-17 ASSESSMENT — PAIN SCALES - GENERAL: PAINLEVEL: NO PAIN (0)

## 2024-01-17 NOTE — PROGRESS NOTES
[facility] Hematology and Oncology Progress Note    Patient: Nava Goldman  MRN: [id number]  Date of Service: Jan 17, 2024         Reason for Visit    Chief Complaint   Patient presents with    Hematology       Assessment and Plan     Cancer Staging   No matching staging information was found for the patient.      ECOG Performance    0 - Independent     Pain  Pain Score: No Pain (0)    #.  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  Oncotype DX 10/3% / <1%.  #.  Post menopause  #.  Low bone density     She is clinically well without signs and symptoms suggestive for breast cancer recurrence.    She tolerates current aromatase inhibitor therapy well without major intolerable side effects.  Plan is to complete 5 years of adjuvant endocrine therapy.  Refilled anastrozole today.   She does not have any indication for systemic imaging.  I discussed that we will obtain imaging with clinical signs and symptoms concerning for breast cancer recurrence, but not routinely.   She does not need screening mammogram.     I reviewed her DEXA scan from 11/2023 and it showed low bone density.  I recommended her to start calcium/vitamin D supplements 1 tablet twice daily.  I also encouraged weightbearing exercises.    She has intermittent left buttock/hip pain.  She will continue to do stretching exercises.   I recommended continue clinical surveillance.  Follow-up provider visit in 6 months.  She is advised to call me sooner with any concerns.    Encounter Diagnoses:    Problem List Items Addressed This Visit       Long term current use of aromatase inhibitor - Primary    Low bone density     Other Visit Diagnoses       Invasive ductal carcinoma of breast, female, left (H)        Relevant Medications    anastrozole (ARIMIDEX) 1 MG tablet             CC: Cadence Quintero, CNP    ______________________________________________________________________________  Oncologic history  4/2023-screening mammogram detected a new asymmetry in the left breast.  Subsequent biopsy confirmed invasive ductal carcinoma, grade 2, ER positive, NJ positive, HER2 negative (1+ by IHC), Ki-67 22%.    6/22/2023-underwent left breast total mastectomy and left axillary sentinel lymph node biopsy, right breast total mastectomy.   -Left breast final pathology showed invasive ductal carcinoma, grade 2.  Tumor was present at 2 separate foci with invasive ductal carcinoma of 1.5 cm with 20% DCIS in the first tumor nodule and DCIS with focus of invasion 0.3 cm in the second nodule.  Margins were negative.  4 sentinel lymph nodes and 1 additional lymph nodes were negative for metastatic carcinoma.   pT1c(m) p N0.    ER strongly positive, NJ strongly positive, HER2 2+ by IHC and negative by FISH.    Oncotype DX 10/3% / <1%.    8/2023-initiated adjuvant endocrine therapy with anastrozole.    History of Present Illness    Ms. Nava Goldman presented herself today for follow-up.  She does not have any intolerable side effects from anastrozole.  She admitted that she missed some days and usually about 90% compliance.  She does not have any headaches, bone pain.  She does not have any abnormal masses.    Review of systems  Apart from describing in HPI, the remainder of comprehensive ROS was negative.    Past History    Past Medical History:   Diagnosis Date    Breast cancer (H)     Infiltrating ductal carcunoma       Past Surgical History:   Procedure Laterality Date    BIOPSY NODE SENTINEL Left 6/22/2023    Procedure: LEFT SENTINEL LYMPH NODE BIOPSY;  Surgeon: Arina Brewster DO;  Location: [facility] OR    BREAST BIOPSY, RT/LT Left     MAMMOPLASTY AUGMENTATION      MASTECTOMY SIMPLE Bilateral 6/22/2023    Procedure: BILATERAL NIPPLE SPARING MASTECTOMY WITH;  Surgeon: Arina Brewster DO;  Location: [facility] OR     "RECONSTRUCT BREAST, IMPLANT PROSTHESIS, COMBINED Bilateral 6/22/2023    Procedure: RECONSTRUCTION, BREAST, IMMEDIATE PERMANENT BREAST IMPLANT, BILATERAL CAPSULE ORIFICE;  Surgeon: Charles Medina MD;  Location: [facility] OR       Physical Exam    /75 (Patient Position: Sitting)   Pulse 83   Temp 98.2  F (36.8  C) (Oral)   Resp 16   Ht 1.549 m (5' 1\")   Wt 53.6 kg (118 lb 3.2 oz)   SpO2 97%   BMI 22.33 kg/m      General: alert, awake, not in acute distress  HEENT: Head: Normal, normocephalic, atraumatic.  Eye: Normal external eye, conjunctiva, lids cornea, VINCENZO.  Nose: Normal external nose, mucus membranes and septum.  Pharynx: Normal buccal mucosa. Normal pharynx.  Neck / Thyroid: Supple, no masses, nodes, nodules or enlargement.  Lymphatics: No abnormally enlarged lymph nodes.  Chest: Normal chest wall and respirations. Clear to auscultation.  Breasts: Reconstructed breast bilaterally.  No palpable abnormalities.  Heart: S1 S2 RRR, no murmur.   Abdomen: abdomen is soft without significant tenderness, masses, organomegaly or guarding  Extremities: normal strength, tone, and muscle mass  Skin: normal. no rash or abnormalities  CNS: non focal.    Lab Results    No results found for this or any previous visit (from the past 240 hour(s)).      Imaging    No results found.    30 minutes spent on the date of the encounter doing chart review, history and exam, documentation, communication of the treatment plan with the care team and further activities as noted above.    Signed by: Fransico Waterman MD  "

## 2024-01-17 NOTE — PROGRESS NOTES
"Oncology Rooming Note    January 17, 2024 9:40 AM   Nava Goldman is a 58 year old female who presents for:    Chief Complaint   Patient presents with    Hematology     Initial Vitals: /75 (Patient Position: Sitting)   Pulse 83   Temp 98.2  F (36.8  C) (Oral)   Resp 16   Ht 1.549 m (5' 1\")   Wt 53.6 kg (118 lb 3.2 oz)   SpO2 97%   BMI 22.33 kg/m   Estimated body mass index is 22.33 kg/m  as calculated from the following:    Height as of this encounter: 1.549 m (5' 1\").    Weight as of this encounter: 53.6 kg (118 lb 3.2 oz). Body surface area is 1.52 meters squared.  No Pain (0) Comment: Data Unavailable   No LMP recorded. Patient is postmenopausal.  Allergies reviewed: Yes  Medications reviewed: Yes    Medications: MEDICATION REFILLS NEEDED TODAY. Provider was notified.  Pharmacy name entered into [facility]: [facility] [address] [place] [address]    Frailty Screening:   Is the patient here for a new oncology consult visit in cancer care? 2. No      Clinical concerns:  Refill needed on Arimidex.       Lyndsey Nicholas LPN            "

## 2024-01-17 NOTE — LETTER
"    1/17/2024         RE: Nava Goldman  [address] [facility] [address] [facility] [address]        Dear Colleague,    Thank you for referring your patient, Nava Goldman, to the [facility]. Please see a copy of my visit note below.    Oncology Rooming Note    January 17, 2024 9:40 AM   Nava Goldman is a 58 year old female who presents for:    Chief Complaint   Patient presents with     Hematology     Initial Vitals: /75 (Patient Position: Sitting)   Pulse 83   Temp 98.2  F (36.8  C) (Oral)   Resp 16   Ht 1.549 m (5' 1\")   Wt 53.6 kg (118 lb 3.2 oz)   SpO2 97%   BMI 22.33 kg/m   Estimated body mass index is 22.33 kg/m  as calculated from the following:    Height as of this encounter: 1.549 m (5' 1\").    Weight as of this encounter: 53.6 kg (118 lb 3.2 oz). Body surface area is 1.52 meters squared.  No Pain (0) Comment: Data Unavailable   No LMP recorded. Patient is postmenopausal.  Allergies reviewed: Yes  Medications reviewed: Yes    Medications: MEDICATION REFILLS NEEDED TODAY. Provider was notified.  Pharmacy name entered into [facility]: [facility] [address]    Frailty Screening:   Is the patient here for a new oncology consult visit in cancer care? 2. No      Clinical concerns:  Refill needed on Arimidex.       Lyndsey Nicholas LPN              [facility] Hematology and Oncology Progress Note    Patient: Nava Goldman  MRN: [id number]  Date of Service: Jan 17, 2024         Reason for Visit    Chief Complaint   Patient presents with     Hematology       Assessment and Plan     Cancer Staging   No matching staging information was found for the patient.      ECOG Performance    0 - Independent     Pain  Pain Score: No Pain (0)    #.  Stage IA (pT1c N0 M0) invasive ductal carcinoma of the upper-outer quadrant of the left breast, ER strongly positive, ND strongly positive, HER2 negative by FISH.  Oncotype DX 10/3% / <1%.  #.  " Post menopause  #.  Low bone density     She is clinically well without signs and symptoms suggestive for breast cancer recurrence.    She tolerates current aromatase inhibitor therapy well without major intolerable side effects.  Plan is to complete 5 years of adjuvant endocrine therapy.  Refilled anastrozole today.   She does not have any indication for systemic imaging.  I discussed that we will obtain imaging with clinical signs and symptoms concerning for breast cancer recurrence, but not routinely.   She does not need screening mammogram.     I reviewed her DEXA scan from 11/2023 and it showed low bone density.  I recommended her to start calcium/vitamin D supplements 1 tablet twice daily.  I also encouraged weightbearing exercises.    She has intermittent left buttock/hip pain.  She will continue to do stretching exercises.   I recommended continue clinical surveillance.  Follow-up provider visit in 6 months.  She is advised to call me sooner with any concerns.    Encounter Diagnoses:    Problem List Items Addressed This Visit       Long term current use of aromatase inhibitor - Primary    Low bone density     Other Visit Diagnoses       Invasive ductal carcinoma of breast, female, left (H)        Relevant Medications    anastrozole (ARIMIDEX) 1 MG tablet             CC: Cadence Quintero, PAOLA   ______________________________________________________________________________  Oncologic history  4/2023-screening mammogram detected a new asymmetry in the left breast.  Subsequent biopsy confirmed invasive ductal carcinoma, grade 2, ER positive, PA positive, HER2 negative (1+ by IHC), Ki-67 22%.    6/22/2023-underwent left breast total mastectomy and left axillary sentinel lymph node biopsy, right breast total mastectomy.   -Left breast final pathology showed invasive ductal carcinoma, grade 2.  Tumor was present at 2 separate foci with invasive ductal carcinoma of 1.5 cm with 20% DCIS in the first tumor nodule and  "DCIS with focus of invasion 0.3 cm in the second nodule.  Margins were negative.  4 sentinel lymph nodes and 1 additional lymph nodes were negative for metastatic carcinoma.   pT1c(m) p N0.    ER strongly positive, KS strongly positive, HER2 2+ by IHC and negative by FISH.    Oncotype DX 10/3% / <1%.    8/2023-initiated adjuvant endocrine therapy with anastrozole.    History of Present Illness    Ms. Nava Goldman presented herself today for follow-up.  She does not have any intolerable side effects from anastrozole.  She admitted that she missed some days and usually about 90% compliance.  She does not have any headaches, bone pain.  She does not have any abnormal masses.    Review of systems  Apart from describing in HPI, the remainder of comprehensive ROS was negative.    Past History    Past Medical History:   Diagnosis Date     Breast cancer (H)     Infiltrating ductal carcunoma       Past Surgical History:   Procedure Laterality Date     BIOPSY NODE SENTINEL Left 6/22/2023    Procedure: LEFT SENTINEL LYMPH NODE BIOPSY;  Surgeon: Arina Brewster DO;  Location: [facility] OR     BREAST BIOPSY, RT/LT Left      MAMMOPLASTY AUGMENTATION       MASTECTOMY SIMPLE Bilateral 6/22/2023    Procedure: BILATERAL NIPPLE SPARING MASTECTOMY WITH;  Surgeon: Arina Brewster DO;  Location: [facility] [place] OR     RECONSTRUCT BREAST, IMPLANT PROSTHESIS, COMBINED Bilateral 6/22/2023    Procedure: RECONSTRUCTION, BREAST, IMMEDIATE PERMANENT BREAST IMPLANT, BILATERAL CAPSULE ORIFICE;  Surgeon: Charles Medina MD;  Location: [facility] [place] OR       Physical Exam    /75 (Patient Position: Sitting)   Pulse 83   Temp 98.2  F (36.8  C) (Oral)   Resp 16   Ht 1.549 m (5' 1\")   Wt 53.6 kg (118 lb 3.2 oz)   SpO2 97%   BMI 22.33 kg/m      General: alert, awake, not in acute distress  HEENT: Head: Normal, normocephalic, atraumatic.  Eye: Normal external eye, conjunctiva, lids cornea, VINCENZO.  Nose: Normal external nose, mucus " membranes and septum.  Pharynx: Normal buccal mucosa. Normal pharynx.  Neck / Thyroid: Supple, no masses, nodes, nodules or enlargement.  Lymphatics: No abnormally enlarged lymph nodes.  Chest: Normal chest wall and respirations. Clear to auscultation.  Breasts: Reconstructed breast bilaterally.  No palpable abnormalities.  Heart: S1 S2 RRR, no murmur.   Abdomen: abdomen is soft without significant tenderness, masses, organomegaly or guarding  Extremities: normal strength, tone, and muscle mass  Skin: normal. no rash or abnormalities  CNS: non focal.    Lab Results    No results found for this or any previous visit (from the past 240 hour(s)).      Imaging    No results found.    30 minutes spent on the date of the encounter doing chart review, history and exam, documentation, communication of the treatment plan with the care team and further activities as noted above.    Signed by: Fransico Waterman MD      Again, thank you for allowing me to participate in the care of your patient.        Sincerely,        Fransico Waterman MD

## 2024-04-01 ENCOUNTER — TELEPHONE (OUTPATIENT)
Dept: SURGERY | Facility: CLINIC | Age: 59
End: 2024-04-01
Payer: COMMERCIAL

## 2024-04-01 NOTE — TELEPHONE ENCOUNTER
I received a voice mail from Cherelle.  She states she got a bill from [employer].  I called her back and told her I was unfamiliar with insurance billing and encouraged her to call the number on the bill.  She states she did already and they want her to fill out papers for help with payment.  I encouraged her to complete that.  She denies any further questions at this time.    Aleksandra Gallagher RN  RN Imaging Care Coordinator  [facility]

## 2024-07-08 ENCOUNTER — OFFICE VISIT (OUTPATIENT)
Dept: SURGERY | Facility: CLINIC | Age: 59
End: 2024-07-08
Attending: SURGERY
Payer: COMMERCIAL

## 2024-07-08 DIAGNOSIS — T85.44XA CAPSULAR CONTRACTURE OF BREAST IMPLANT, INITIAL ENCOUNTER: Primary | ICD-10-CM

## 2024-07-08 PROCEDURE — 99212 OFFICE O/P EST SF 10 MIN: CPT | Performed by: SURGERY

## 2024-07-08 PROCEDURE — G0463 HOSPITAL OUTPT CLINIC VISIT: HCPCS | Performed by: SURGERY

## 2024-07-08 NOTE — LETTER
7/8/2024      Nava Goldman  [address] [address]      Dear Colleague,    Thank you for referring your patient, Nava Goldman, to the [facility] BREAST CLINIC [place]. Please see a copy of my visit note below.    History:  Nava Goldman presents for one year follow-up of breast cancer.  She is s/p bilateral nipple sparing mastectomy with immediate reconstruction in June 2023.  This was followed by initiation of endocrine therapy.  She is doing well.  Does have some aches from the anastrozole.  She can see that her right reconstructed breast looks a little bit different compared to the left.    Physical exam:  BREAST: Well-healed inframammary scars.  Right implant is clearly riding higher and is now misshapen.  It is oblong longitudinally.  Palpably the capsule around the right implant is firm compared to the left.  No palpable subcutaneous masses.    ASSESSMENT:  Nava Goldman is s/p bilateral mastectomy for left breast invasive ductal carcinoma    - Grade II, T1, N0, ER/NH+, HER2- --> pathologic prognostic stage IA    - Grade III right breast capsular contracture    PLAN:  I recommend she return to plastic surgery to discuss the capsular contracture.  Continue with yearly clinical exams.  Endocrine therapy per Dr. Waterman.  Return to the breast clinic as needed.    Arina Brewster DO  General Surgeon  [facility]  Breast Center - [address] [address]  Office: [phone]  Employed by [employer]      Again, thank you for allowing me to participate in the care of your patient.        Sincerely,        Arina Brewster DO

## 2024-07-08 NOTE — NURSING NOTE
Cherelle presents to [facility] for a follow up appointment with Dr. Brewster .Patient notes no new breast concerns.  Patient is accompanied by her  for appointment.   She is following with Dr. Waterman  for medical oncology and is taking endocrine therapy, tolerating it fair.  RN assessment and EMRupdate.  Patient met with Dr. Brewster .  See dictation for details of visit and follow up plan.  Support provided, invited calls.

## 2024-07-09 ENCOUNTER — TELEPHONE (OUTPATIENT)
Dept: PLASTIC SURGERY | Facility: CLINIC | Age: 59
End: 2024-07-09
Payer: COMMERCIAL

## 2024-07-09 NOTE — TELEPHONE ENCOUNTER
Left Voicemail (1st Attempt), sent myc for the patient to call back and schedule the following:    Appointment type: New Plastic Surgery  Provider: Dr. Caraballo  Return date: Next available - ok to schedule in [facility] on a Tuesday per message from Mercedez WILDER RN  Specialty phone number: [phone]  Additional appointment(s) needed: n/a  Additonal Notes: referred by Dr. Brewster for  Capsular contracture of breast implant       Left direct #

## 2024-07-11 NOTE — TELEPHONE ENCOUNTER
Patient confirmed scheduled appointment:  Date: 9/11/24  Time: 10:30 am  Visit type: New Plastic Surgery Long  Provider: Dr. Caraballo  Location: [facility]  Testing/imaging: n/a  Additional notes: ok to schedule on a Wednesday per Mercedez WILDER RN

## 2024-07-12 NOTE — TELEPHONE ENCOUNTER
FUTURE VISIT INFORMATION      FUTURE VISIT INFORMATION:  Date: 9/11/24  Time: 2:30pm  Location: [facility]  REFERRAL INFORMATION:  Referring provider:  Dr. Brewster   Referring providers clinic:  [facility]  Reason for visit/diagnosis  Capsular contracture of breast implant     RECORDS REQUESTED FROM:       Clinic name Comments Records Status Imaging Status   [facility] OV/referral 7/8/24 EPIC

## 2024-07-21 ENCOUNTER — HEALTH MAINTENANCE LETTER (OUTPATIENT)
Age: 59
End: 2024-07-21

## 2024-09-11 ENCOUNTER — PRE VISIT (OUTPATIENT)
Dept: PLASTIC SURGERY | Facility: CLINIC | Age: 59
End: 2024-09-11

## 2024-09-23 ENCOUNTER — TELEPHONE (OUTPATIENT)
Dept: PLASTIC SURGERY | Facility: CLINIC | Age: 59
End: 2024-09-23
Payer: COMMERCIAL

## 2024-09-23 ENCOUNTER — TELEPHONE (OUTPATIENT)
Dept: SURGERY | Facility: CLINIC | Age: 59
End: 2024-09-23
Payer: COMMERCIAL

## 2024-09-23 NOTE — TELEPHONE ENCOUNTER
Cherelle called, left a voicemail that she has a skin lesion, near her incision, that has been there for over a week and it is not healing. Returned her call, asked her to come see Dr. Brewster on Weds, 9-25-24 at 2:00 pm. LMOM, asked for call back to confirm appointment.

## 2024-09-23 NOTE — TELEPHONE ENCOUNTER
Patient confirmed scheduled appointment:  Date: 10/29/2024  Time: 830 am   Visit type: New Plastic Surgery long  Provider:    Location: [facility]  Testing/imaging: n/a  Additional notes: n/a

## 2024-09-24 NOTE — TELEPHONE ENCOUNTER
FUTURE VISIT INFORMATION      FUTURE VISIT INFORMATION:  Date: 10/29/24  Time: 8:30am  Location: [facility]  REFERRAL INFORMATION:  Referring provider:  Arina Brewster DO   [facility]  Reason for visit/diagnosis   Capsular contracture of breast implant     RECORDS REQUESTED FROM:       Clinic name Comments Records Status Imaging Status   [facility] gen surg Ov/referral 7/8/24 [facility] Surg BILATERAL NIPPLE SPARING MASTECTOMY WITH LEFT SENTINEL LYMPH NODE BIOPSY RECONSTRUCTION, BREAST, IMMEDIATE PERMANENT BREAST IMPLANT, BILATERAL CAPSULE ORIFICE done 6/22/23 [facility]

## 2024-09-25 ENCOUNTER — OFFICE VISIT (OUTPATIENT)
Dept: SURGERY | Facility: CLINIC | Age: 59
End: 2024-09-25
Attending: NURSE PRACTITIONER
Payer: COMMERCIAL

## 2024-09-25 DIAGNOSIS — N61.1 BREAST ABSCESS: Primary | ICD-10-CM

## 2024-09-25 PROCEDURE — G0463 HOSPITAL OUTPT CLINIC VISIT: HCPCS | Performed by: SURGERY

## 2024-09-25 PROCEDURE — 99212 OFFICE O/P EST SF 10 MIN: CPT | Performed by: SURGERY

## 2024-09-25 NOTE — NURSING NOTE
Cherelle presents to [facility] for a follow up appointment with Dr. Brewster .Patient notes a blister type lesion, under her left breast, on the scar, for approx 2 weeks.   RN assessment and EMRupdate.  Patient met with Dr. Brewster .  See dictation for details of visit and follow up plan.  Support provided, invited calls.

## 2024-09-25 NOTE — LETTER
9/25/2024      Nava Goldman  [address] [address]      Dear Colleague,    Thank you for referring your patient, Nava Goldman, to the [facility] BREAST CLINIC [place]. Please see a copy of my visit note below.    History:  Nava Goldman is a 59 year old female [place]  It was very hot and she was doing a lot of sweating.  She developed a painful lump along her inframammary incision on the left lateral side.  It drained bloody fluid on September 13.  It has slowly been healing up.    Physical exam:  BREAST: 5 mm area of granulation tissue along the left inframammary incision just medial to the anterior axillary line.  There is no evidence of active infection.      ASSESSMENT:  Nava Goldman is a 59 year old female who appears to have had a recent skin abscess on her mastectomy scar.  She may have developed a suture abscess.  It is now in the healing phase.    PLAN:  Silver nitrate was placed on the granulation tissue  She will cover this area with a Band-Aid as long as it is continuing to heal  Keep appointment with Dr. Caraballo next month  Return to breast surgery clinic as needed    Arina Brewster DO  General Surgeon  [facility]  Breast [place] [facility]  [address] [place] [address]  Office: [phone]  Employed by [employer]      Again, thank you for allowing me to participate in the care of your patient.        Sincerely,        Arina Brewster DO

## 2024-09-25 NOTE — PROGRESS NOTES
History:  Nava Goldman is a 59 year old female who was [place].  It was very hot and she was doing a lot of sweating.  She developed a painful lump along her inframammary incision on the left lateral side.  It drained bloody fluid on September 13.  It has slowly been healing up.    Physical exam:  BREAST: 5 mm area of granulation tissue along the left inframammary incision just medial to the anterior axillary line.  There is no evidence of active infection.      ASSESSMENT:  Nava Goldman is a 59 year old female who appears to have had a recent skin abscess on her mastectomy scar.  She may have developed a suture abscess.  It is now in the healing phase.    PLAN:  Silver nitrate was placed on the granulation tissue  She will cover this area with a Band-Aid as long as it is continuing to heal  Keep appointment with Dr. Caraballo next month  Return to breast surgery clinic as needed    Arina Brewster DO  General Surgeon  [facility]  Breast [address] [address]  Office: [phone]  Employed by - [employer]

## 2024-09-30 DIAGNOSIS — C50.912 INVASIVE DUCTAL CARCINOMA OF BREAST, FEMALE, LEFT (H): ICD-10-CM

## 2024-09-30 RX ORDER — ANASTROZOLE 1 MG/1
1 TABLET ORAL DAILY
Qty: 30 TABLET | Refills: 0 | Status: SHIPPED | OUTPATIENT
Start: 2024-09-30

## 2024-09-30 NOTE — TELEPHONE ENCOUNTER
[facility]: Cancer Care                                                                                          Refill request received via [employer] from [facility] for anastrozole  Last refilled by Dr. Waterman on 1/17/24. Quantity #90. 1 refill.    Last seen by Dr. Waterman on 7/17/24.    Overdue for follow-up:  appointments on 7/18/24 with Leslie Reid CNP and 7/24/24 with Florinda Puri CNP, canceled.      Refill sent for 30 day supply only until patient has follow-up and message sent to Schedulers to call patient to schedule follow-up.    Message sent to Dr. Waterman.    Signature:  Glenis Metz RN

## 2024-10-11 ENCOUNTER — TELEPHONE (OUTPATIENT)
Dept: SURGERY | Facility: CLINIC | Age: 59
End: 2024-10-11
Payer: COMMERCIAL

## 2024-10-11 NOTE — TELEPHONE ENCOUNTER
"Cherelle called, said the \"blister\" she saw Dr. Brewster for recently has returned. Per Dr. Brewster, she would like her to see the plastic  surgeon for this as it is on her incision.  Cherelle said she has an appointment with Dr. Caraballo on 10-29-24 as Dr. Medina is no longer here.  Told her to contact Dr. Caraballo's nurse, Mercedez, as there is a [employer] message that the appointment on 10-29-24 needs to be rescheduled.  Told her that Mercedez has her phone number in the [employer] message and to call her to talk about getting a new appointment as well as the concern she is having. She agrees.  Told her to call me back if she is needing any help with that.   "

## 2024-10-16 ENCOUNTER — OFFICE VISIT (OUTPATIENT)
Dept: PLASTIC SURGERY | Facility: CLINIC | Age: 59
End: 2024-10-16
Payer: COMMERCIAL

## 2024-10-16 VITALS
HEIGHT: 61 IN | DIASTOLIC BLOOD PRESSURE: 70 MMHG | OXYGEN SATURATION: 99 % | HEART RATE: 71 BPM | WEIGHT: 120 LBS | SYSTOLIC BLOOD PRESSURE: 115 MMHG | BODY MASS INDEX: 22.66 KG/M2

## 2024-10-16 DIAGNOSIS — T85.44XS CAPSULAR CONTRACTURE OF BREAST IMPLANT, SEQUELA: ICD-10-CM

## 2024-10-16 DIAGNOSIS — Z98.890 STATUS POST BILATERAL BREAST RECONSTRUCTION: Primary | ICD-10-CM

## 2024-10-16 PROCEDURE — 99214 OFFICE O/P EST MOD 30 MIN: CPT | Performed by: PLASTIC SURGERY

## 2024-10-16 ASSESSMENT — PAIN SCALES - GENERAL: PAINLEVEL: NO PAIN (0)

## 2024-10-16 NOTE — LETTER
10/16/2024       RE: Nava Goldman  [address] [facility] [address]     Dear Colleague,    Thank you for referring your patient, Nava Goldman, to the [facility] PLASTIC AND RECONSTRUCTIVE SURGERY CLINIC [place] [facility]. Please see a copy of my visit note below.       PRESENTING COMPLAINT:  Follow up visit for bilateral breast reconstruction     HISTORY OF PRESENTING COMPLAINT: The patient is here for follow up.  The patient is being seen in the presence of my nurse.     The patient is my colleagues patient who has now left the institution.  The patient was diagnosed with left-sided breast cancer in June 2024 underwent bilateral nipple sparing mastectomy and direct to implant reconstruction in the submuscular plane.  AlloDerm was used.  No radiation was undertaken.  The patient in the last few weeks has developed a recurrent possibly suture abscess in the left inframammary fold area and has developed over the months right-sided capsular contracture.  Here to have these areas looked at.    On exam: Mild signs stable afebrile.  Her left breast is soft with grade 2 capsule with no evidence for infection.  In the inframammary fold there is a pinhole in the lateral aspect.  No evidence for cellulitis or purulence.    On examination of her right breast it is smaller more elevated and has grade 3 capsule.     ASSESSMENT AND PLAN:  Based upon the above findings, the patient is here for follow up.     I discussed in detail with the patient and her  options of either allowing the pinhole to heal in secondarily over time or proceed with a more definitive treatment with excision of wound and closure.  Most likely this is a suture abscess.  With regards to her right breast options include doing nothing versus aggressive capsulotomy/capsulectomy and implant exchange or switching to bilateral Erin flap reconstructions.  Pros and cons of each  option were explained to the patient and her  in detail.  They will think about all of these options and let me know how they want to proceed.    All questions were answered.  The patient was happy with the visit.    Total time spent in the encounter today including chart review visit itself and postvisit paperwork was 30 minutes.      Again, thank you for allowing me to participate in the care of your patient.      Sincerely,    GUILHERME Caraballo MD

## 2024-10-16 NOTE — NURSING NOTE
"Chief Complaint   Patient presents with    GUIDO Villarreal, is being seen today for a follow up L side skin blister.       Vitals:    10/16/24 1126   BP: 115/70   BP Location: Left arm   Patient Position: Chair   Cuff Size: Adult Regular   Pulse: 71   SpO2: 99%   Weight: 54.4 kg (120 lb)   Height: 1.549 m (5' 1\")       Body mass index is 22.67 kg/m .      Brittany Patel LPN    "

## 2024-10-16 NOTE — PROGRESS NOTES
PRESENTING COMPLAINT:  Follow up visit for bilateral breast reconstruction     HISTORY OF PRESENTING COMPLAINT: The patient is here for follow up.  The patient is being seen in the presence of my nurse.     The patient is my colleagues patient who has now left the institution.  The patient was diagnosed with left-sided breast cancer in June 2024 underwent bilateral nipple sparing mastectomy and direct to implant reconstruction in the submuscular plane.  AlloDerm was used.  No radiation was undertaken.  The patient in the last few weeks has developed a recurrent possibly suture abscess in the left inframammary fold area and has developed over the months right-sided capsular contracture.  Here to have these areas looked at.    On exam: Mild signs stable afebrile.  Her left breast is soft with grade 2 capsule with no evidence for infection.  In the inframammary fold there is a pinhole in the lateral aspect.  No evidence for cellulitis or purulence.    On examination of her right breast it is smaller more elevated and has grade 3 capsule.     ASSESSMENT AND PLAN:  Based upon the above findings, the patient is here for follow up.     I discussed in detail with the patient and her  options of either allowing the pinhole to heal in secondarily over time or proceed with a more definitive treatment with excision of wound and closure.  Most likely this is a suture abscess.  With regards to her right breast options include doing nothing versus aggressive capsulotomy/capsulectomy and implant exchange or switching to bilateral Erin flap reconstructions.  Pros and cons of each option were explained to the patient and her  in detail.  They will think about all of these options and let me know how they want to proceed.    All questions were answered.  The patient was happy with the visit.    Total time spent in the encounter today including chart review visit itself and postvisit paperwork was 30 minutes.

## 2024-10-29 ENCOUNTER — PRE VISIT (OUTPATIENT)
Dept: PLASTIC SURGERY | Facility: CLINIC | Age: 59
End: 2024-10-29
Payer: COMMERCIAL

## 2024-11-07 ENCOUNTER — PATIENT OUTREACH (OUTPATIENT)
Dept: ONCOLOGY | Facility: HOSPITAL | Age: 59
End: 2024-11-07
Payer: COMMERCIAL

## 2024-11-07 NOTE — PROGRESS NOTES
[facility]: Cancer Care                                                                                          Situation: Patient chart reviewed by care coordinator.    Background: Patient has history of Stage IA (pT1c N0 M0) invasive ductal carcinoma of the upper-outer quadrant of the left breast, ER strongly positive, MI strongly positive, HER2 negative by FISH. Oncotype DX 10/3% / <1%.     Assessment: Patient saw Dr. Waterman in follow-up on 1/17/24.  She was taking and tolerating anastrozole. Dr. Waterman recommended 6 month clinical exam. Patient canceled appointments on 7/18/24 and 7/24/24.  Attempts were made by Scheduling staff, and letter sent to patient to attempt to reschedule.    Plan/Recommendations: [employer] message sent to patient today to call and schedule a follow-up appointment.    Glenis Metz RN November 7, 2024 12:49 PM

## 2024-11-10 ENCOUNTER — NURSE TRIAGE (OUTPATIENT)
Dept: NURSING | Facility: CLINIC | Age: 59
End: 2024-11-10
Payer: COMMERCIAL

## 2024-11-10 ENCOUNTER — TELEPHONE (OUTPATIENT)
Dept: ONCOLOGY | Facility: HOSPITAL | Age: 59
End: 2024-11-10
Payer: COMMERCIAL

## 2024-11-10 NOTE — TELEPHONE ENCOUNTER
Having out of medication and leaves for [place] in a few hours. Spoke with someone who said they would process the refill today. Haven't heard back from them. Dad just passed away so going to [place].  Anastrozole 1 mg tablet. I connected her with the page  for [facility]. I also gave her the phone number to the answering service so she can call directly if she doesn't hear back from anyone.    Natalie Tam RN  [facility]    Reason for Disposition   [1] Prescription refill request for ESSENTIAL medicine (i.e., likelihood of harm to patient if not taken) AND [2] triager unable to refill per department policy    Additional Information   Negative: New-onset or worsening symptoms, see that guideline (e.g., diarrhea, runny nose, sore throat)   Negative: Medicine question not related to refill or renewal   Negative: Caller (e.g., patient or pharmacist) requesting information about a new medicine   Negative: Caller requesting information unrelated to medicine    Protocols used: Medication Refill and Renewal Call-A-

## 2024-12-11 ENCOUNTER — TELEPHONE (OUTPATIENT)
Dept: ONCOLOGY | Facility: HOSPITAL | Age: 59
End: 2024-12-11
Payer: COMMERCIAL

## 2024-12-11 NOTE — TELEPHONE ENCOUNTER
I received a phone call today from Cherelle.  She is calling because she has been in [place] and just got home this weekend.  She is scheduled next week at [facility] and is transferring her care.  She is calling to see if Dr. Waterman would be willing to send in a refill of her Arimidex.  She states she has not taken it for over a month. Per the note from 11/12/24 - Per Dr. Waterman the medication will not be refilled at this time. She can discuss at her appointment with [facility].  Cherelle is aware and has no other questions at this time.     Yoko العراقي RN on 12/11/2024 at 10:50 AM

## 2024-12-15 ENCOUNTER — MYC REFILL (OUTPATIENT)
Dept: ONCOLOGY | Facility: HOSPITAL | Age: 59
End: 2024-12-15
Payer: COMMERCIAL

## 2024-12-15 DIAGNOSIS — C50.912 INVASIVE DUCTAL CARCINOMA OF BREAST, FEMALE, LEFT (H): ICD-10-CM

## 2024-12-16 RX ORDER — ANASTROZOLE 1 MG/1
1 TABLET ORAL DAILY
Qty: 30 TABLET | Refills: 0 | OUTPATIENT
Start: 2024-12-16

## 2024-12-16 NOTE — TELEPHONE ENCOUNTER
Refusing refill request. Patient either needs appointment in clinic or to discuss with [facility].    Last Written Prescription Date:  9/30/24  Last Fill Quantity: 30,  # refills: 0   Last office visit provider:  1/17/24 with Dr. Waterman     Requested Prescriptions   Pending Prescriptions Disp Refills    anastrozole (ARIMIDEX) 1 MG tablet 30 tablet 0     Sig: Take 1 tablet (1 mg) by mouth daily.       There is no refill protocol information for this order          Jany Calderón RN 12/16/24 9:10 AM

## 2025-05-22 ENCOUNTER — TRANSFERRED RECORDS (OUTPATIENT)
Dept: HEALTH INFORMATION MANAGEMENT | Facility: CLINIC | Age: 60
End: 2025-05-22
Payer: COMMERCIAL

## 2025-06-05 ENCOUNTER — PATIENT OUTREACH (OUTPATIENT)
Dept: CARE COORDINATION | Facility: CLINIC | Age: 60
End: 2025-06-05
Payer: COMMERCIAL

## 2025-06-05 ENCOUNTER — TRANSCRIBE ORDERS (OUTPATIENT)
Dept: OTHER | Age: 60
End: 2025-06-05

## 2025-06-05 DIAGNOSIS — C50.919 BREAST CANCER, FEMALE (H): Primary | ICD-10-CM

## 2025-06-09 ENCOUNTER — PATIENT OUTREACH (OUTPATIENT)
Dept: CARE COORDINATION | Facility: CLINIC | Age: 60
End: 2025-06-09
Payer: COMMERCIAL

## 2025-08-10 ENCOUNTER — HEALTH MAINTENANCE LETTER (OUTPATIENT)
Age: 60
End: 2025-08-10

## 2025-08-30 ENCOUNTER — OFFICE VISIT (OUTPATIENT)
Dept: URGENT CARE | Facility: URGENT CARE | Age: 60
End: 2025-08-30
Payer: COMMERCIAL

## 2025-08-30 VITALS
OXYGEN SATURATION: 98 % | SYSTOLIC BLOOD PRESSURE: 111 MMHG | RESPIRATION RATE: 19 BRPM | TEMPERATURE: 97.7 F | WEIGHT: 117.7 LBS | HEART RATE: 72 BPM | BODY MASS INDEX: 22.24 KG/M2 | DIASTOLIC BLOOD PRESSURE: 69 MMHG

## 2025-08-30 DIAGNOSIS — U07.1 INFECTION DUE TO 2019 NOVEL CORONAVIRUS: Primary | ICD-10-CM

## 2025-08-30 PROBLEM — C50.919 PRIMARY MALIGNANT NEOPLASM OF BREAST (H): Status: ACTIVE | Noted: 2025-08-30

## 2025-08-30 PROBLEM — C50.912 PRIMARY CANCER OF LEFT BREAST (H): Status: ACTIVE | Noted: 2023-05-02

## 2025-08-30 PROBLEM — T83.9XXA: Status: ACTIVE | Noted: 2017-09-16

## 2025-08-30 PROCEDURE — 3074F SYST BP LT 130 MM HG: CPT | Performed by: PHYSICIAN ASSISTANT

## 2025-08-30 PROCEDURE — 3078F DIAST BP <80 MM HG: CPT | Performed by: PHYSICIAN ASSISTANT

## 2025-08-30 PROCEDURE — 99203 OFFICE O/P NEW LOW 30 MIN: CPT | Performed by: PHYSICIAN ASSISTANT

## (undated) DEVICE — DRSG GAUZE 4X4" TRAY 6939

## (undated) DEVICE — PREP DYNA-HEX 4% CHG SCRUB 4OZ BOTTLE MDS098710

## (undated) DEVICE — Device

## (undated) DEVICE — GLOVE SURG PI ULTRA TOUCH M SZ 6-1/2 LF

## (undated) DEVICE — BANDAGE ELASTIC VELCRO 6IN REB3016

## (undated) DEVICE — COVER ULTRASOUND PROBE FLEXI-FEEL 4X96" 25-FF496

## (undated) DEVICE — ESU ELEC BLADE 2.75" COATED/INSULATED E1455

## (undated) DEVICE — DRSG TEGADERM 8X12" 1629

## (undated) DEVICE — PITCHER STERILE 1000ML  SSK9004A

## (undated) DEVICE — DRAPE IOBAN INCISE 23X17" 6650EZ

## (undated) DEVICE — SU MONOCRYL+ 4-0 18IN PS2 UND MCP496G

## (undated) DEVICE — DRAPE CHEST 100X72X124 89227

## (undated) DEVICE — CUSTOM PACK GEN MAJOR SBA5BGMHEA

## (undated) DEVICE — LIGACLIP MEDIUM AESCULAP B2180-1

## (undated) DEVICE — GLOVE GAMMEX NEOPRENE ULTRA SZ 7.5 LF 8515

## (undated) DEVICE — DRSG TEGADERM 4X4 3/4" 1626W

## (undated) DEVICE — SOL WATER IRRIG 1000ML BOTTLE 2F7114

## (undated) DEVICE — PLATE GROUNDING ADULT W/CORD 9165L

## (undated) DEVICE — STPL SKIN 35W 6.9MM  PXW35

## (undated) DEVICE — SPONGE LAP 12X12" X8425

## (undated) DEVICE — SUTURE VICRYL+ 2-0 27IN SH UND VCP417H

## (undated) DEVICE — SYR 10ML LL W/O NDL 302995

## (undated) DEVICE — HOLDER DRAINAGE BULB 0814-8220

## (undated) DEVICE — SU PROLENE 6-0 P-1 18" 8697G

## (undated) DEVICE — SU MONOCRYL 3-0 PS-2 18" UND MCP497G

## (undated) DEVICE — CLEANER CAUTERY TIP V8101

## (undated) DEVICE — SYR 01ML LL W/O NDL LATEX FREE 309628

## (undated) DEVICE — SU PDS II 2-0 SH 27" Z317H

## (undated) DEVICE — GOWN IMPERVIOUS BREATHABLE SMART LG 89015

## (undated) DEVICE — DRESSING XEROFORM PETROLATUM 5X9 33605

## (undated) DEVICE — ESU ELEC BLADE 6" COATED E1450-6

## (undated) DEVICE — DRSG BIOPATCH GERMICIDAL SPLIT SPONGE 4MM MED 4150

## (undated) DEVICE — DRAPE SHEET REV FOLD 3/4 9349

## (undated) DEVICE — DECANTER VIAL 2006S

## (undated) DEVICE — SU SILK 2-0 SH 30" K833H

## (undated) DEVICE — DRAIN RESERVOIR 100ML JP 0070740

## (undated) DEVICE — SUCTION TIP YANKAUER W/O VENT K86

## (undated) DEVICE — DRSG STERI STRIP 1/2X4" R1547

## (undated) DEVICE — SYR BULB IRRIG DOVER 60 ML LATEX FREE 67000

## (undated) DEVICE — GLOVE BIOGEL PI INDICATOR 8.0 LF 41680

## (undated) DEVICE — BLADE KNIFE SURG 10 371110

## (undated) DEVICE — GLOVE BIOGEL PI ULTRATOUCH G SZ 6.0 42160

## (undated) DEVICE — BLADE KNIFE SURG 15 371115

## (undated) DEVICE — NEEDLE HYPO MAGELLAN SAFETY 22GA 1 1/2IN 8881850215

## (undated) DEVICE — SUTURE VICRYL 0 SH UNDYED J418H

## (undated) DEVICE — DRAIN BLAKE 15FR SIL 2229

## (undated) DEVICE — SU SILK 2-0 FS-1 18" 685G

## (undated) DEVICE — CLIP HORIZON MULTI MED BLUE 002204

## (undated) DEVICE — CATH TRAY FOLEY SURESTEP 16FR DRAIN BAG STATOCK A899916

## (undated) DEVICE — SPONGE LAP 18X18" X8435

## (undated) DEVICE — PROBE ELECTROSURGICAL TRUNODE GAMMA 120-807605

## (undated) DEVICE — SOL NACL 0.9% IRRIG 1000ML BOTTLE 2F7124

## (undated) DEVICE — SU ETHILON 2-0 FS 18" 664H

## (undated) DEVICE — PREP CHLORAPREP 26ML TINTED HI-LITE ORANGE 930815

## (undated) DEVICE — ESU PENCIL SMOKE EVAC W/ROCKER SWITCH 0703-047-000

## (undated) DEVICE — GLOVE UNDER INDICATOR PI SZ 7.0 LF 41670

## (undated) DEVICE — SUCTION MANIFOLD NEPTUNE 2 SYS 1 PORT 702-025-000

## (undated) DEVICE — SU STRATAFIX MONOCRYL 4-0 SPIRAL PS-2 SXMP1B118

## (undated) DEVICE — DRSG GAUZE 4X4" 3033

## (undated) DEVICE — TAPE TRANSPORE 2"X1.5YD 1534S-2

## (undated) DEVICE — ESU ELEC EDGE INSULATED BLADE 4" E1455-4

## (undated) DEVICE — ADH LIQUID MASTISOL TOPICAL VIAL 2-3ML 0523-48

## (undated) DEVICE — A3 SUPPLIES- SEE NURSING INFO PAGE

## (undated) RX ORDER — DEXAMETHASONE SODIUM PHOSPHATE 10 MG/ML
INJECTION, SOLUTION INTRAMUSCULAR; INTRAVENOUS
Status: DISPENSED
Start: 2023-06-22

## (undated) RX ORDER — PROPOFOL 10 MG/ML
INJECTION, EMULSION INTRAVENOUS
Status: DISPENSED
Start: 2023-06-22

## (undated) RX ORDER — LIDOCAINE HYDROCHLORIDE AND EPINEPHRINE 10; 10 MG/ML; UG/ML
INJECTION, SOLUTION INFILTRATION; PERINEURAL
Status: DISPENSED
Start: 2023-06-22

## (undated) RX ORDER — FENTANYL CITRATE 50 UG/ML
INJECTION, SOLUTION INTRAMUSCULAR; INTRAVENOUS
Status: DISPENSED
Start: 2023-06-22

## (undated) RX ORDER — FENTANYL CITRATE-0.9 % NACL/PF 10 MCG/ML
PLASTIC BAG, INJECTION (ML) INTRAVENOUS
Status: DISPENSED
Start: 2023-06-22

## (undated) RX ORDER — CEFAZOLIN SODIUM 1 G/3ML
INJECTION, POWDER, FOR SOLUTION INTRAMUSCULAR; INTRAVENOUS
Status: DISPENSED
Start: 2023-06-22

## (undated) RX ORDER — ONDANSETRON 2 MG/ML
INJECTION INTRAMUSCULAR; INTRAVENOUS
Status: DISPENSED
Start: 2023-06-22

## (undated) RX ORDER — INDOCYANINE GREEN AND WATER 25 MG
KIT INJECTION
Status: DISPENSED
Start: 2023-06-22

## (undated) RX ORDER — LIDOCAINE HYDROCHLORIDE 10 MG/ML
INJECTION, SOLUTION EPIDURAL; INFILTRATION; INTRACAUDAL; PERINEURAL
Status: DISPENSED
Start: 2023-06-22

## (undated) RX ORDER — GINSENG 100 MG
CAPSULE ORAL
Status: DISPENSED
Start: 2023-06-22

## (undated) RX ORDER — GLYCOPYRROLATE 0.2 MG/ML
INJECTION, SOLUTION INTRAMUSCULAR; INTRAVENOUS
Status: DISPENSED
Start: 2023-06-22